# Patient Record
Sex: FEMALE | Race: WHITE | NOT HISPANIC OR LATINO | URBAN - METROPOLITAN AREA
[De-identification: names, ages, dates, MRNs, and addresses within clinical notes are randomized per-mention and may not be internally consistent; named-entity substitution may affect disease eponyms.]

---

## 2022-06-23 ENCOUNTER — EMERGENCY (EMERGENCY)
Facility: HOSPITAL | Age: 87
LOS: 1 days | Discharge: HOME | End: 2022-06-23
Attending: STUDENT IN AN ORGANIZED HEALTH CARE EDUCATION/TRAINING PROGRAM | Admitting: STUDENT IN AN ORGANIZED HEALTH CARE EDUCATION/TRAINING PROGRAM
Payer: MEDICARE

## 2022-06-23 VITALS
OXYGEN SATURATION: 100 % | TEMPERATURE: 98 F | SYSTOLIC BLOOD PRESSURE: 185 MMHG | DIASTOLIC BLOOD PRESSURE: 76 MMHG | HEART RATE: 71 BPM | RESPIRATION RATE: 16 BRPM

## 2022-06-23 VITALS
RESPIRATION RATE: 15 BRPM | OXYGEN SATURATION: 99 % | HEART RATE: 73 BPM | TEMPERATURE: 98 F | DIASTOLIC BLOOD PRESSURE: 65 MMHG | SYSTOLIC BLOOD PRESSURE: 137 MMHG

## 2022-06-23 LAB
ALBUMIN SERPL ELPH-MCNC: 4.2 G/DL — SIGNIFICANT CHANGE UP (ref 3.3–5)
ALP SERPL-CCNC: 96 U/L — SIGNIFICANT CHANGE UP (ref 40–120)
ALT FLD-CCNC: 10 U/L — SIGNIFICANT CHANGE UP (ref 4–33)
ANION GAP SERPL CALC-SCNC: 10 MMOL/L — SIGNIFICANT CHANGE UP (ref 7–14)
APPEARANCE UR: CLEAR — SIGNIFICANT CHANGE UP
AST SERPL-CCNC: 17 U/L — SIGNIFICANT CHANGE UP (ref 4–32)
BACTERIA # UR AUTO: ABNORMAL
BILIRUB SERPL-MCNC: 0.4 MG/DL — SIGNIFICANT CHANGE UP (ref 0.2–1.2)
BILIRUB UR-MCNC: NEGATIVE — SIGNIFICANT CHANGE UP
BUN SERPL-MCNC: 23 MG/DL — SIGNIFICANT CHANGE UP (ref 7–23)
CALCIUM SERPL-MCNC: 9.6 MG/DL — SIGNIFICANT CHANGE UP (ref 8.4–10.5)
CHLORIDE SERPL-SCNC: 104 MMOL/L — SIGNIFICANT CHANGE UP (ref 98–107)
CO2 SERPL-SCNC: 25 MMOL/L — SIGNIFICANT CHANGE UP (ref 22–31)
COLOR SPEC: SIGNIFICANT CHANGE UP
CREAT SERPL-MCNC: 0.96 MG/DL — SIGNIFICANT CHANGE UP (ref 0.5–1.3)
DIFF PNL FLD: ABNORMAL
EGFR: 53 ML/MIN/1.73M2 — LOW
EPI CELLS # UR: 10 /HPF — HIGH (ref 0–5)
GLUCOSE SERPL-MCNC: 84 MG/DL — SIGNIFICANT CHANGE UP (ref 70–99)
GLUCOSE UR QL: NEGATIVE — SIGNIFICANT CHANGE UP
HCT VFR BLD CALC: 40.1 % — SIGNIFICANT CHANGE UP (ref 34.5–45)
HGB BLD-MCNC: 12.6 G/DL — SIGNIFICANT CHANGE UP (ref 11.5–15.5)
HYALINE CASTS # UR AUTO: 0 /LPF — SIGNIFICANT CHANGE UP (ref 0–7)
KETONES UR-MCNC: ABNORMAL
LEUKOCYTE ESTERASE UR-ACNC: ABNORMAL
MCHC RBC-ENTMCNC: 26.1 PG — LOW (ref 27–34)
MCHC RBC-ENTMCNC: 31.4 GM/DL — LOW (ref 32–36)
MCV RBC AUTO: 83 FL — SIGNIFICANT CHANGE UP (ref 80–100)
NITRITE UR-MCNC: NEGATIVE — SIGNIFICANT CHANGE UP
NRBC # BLD: 0 /100 WBCS — SIGNIFICANT CHANGE UP
NRBC # FLD: 0 K/UL — SIGNIFICANT CHANGE UP
PH UR: 6 — SIGNIFICANT CHANGE UP (ref 5–8)
PLATELET # BLD AUTO: 261 K/UL — SIGNIFICANT CHANGE UP (ref 150–400)
POTASSIUM SERPL-MCNC: 4.2 MMOL/L — SIGNIFICANT CHANGE UP (ref 3.5–5.3)
POTASSIUM SERPL-SCNC: 4.2 MMOL/L — SIGNIFICANT CHANGE UP (ref 3.5–5.3)
PROT SERPL-MCNC: 6.5 G/DL — SIGNIFICANT CHANGE UP (ref 6–8.3)
PROT UR-MCNC: NEGATIVE — SIGNIFICANT CHANGE UP
RBC # BLD: 4.83 M/UL — SIGNIFICANT CHANGE UP (ref 3.8–5.2)
RBC # FLD: 14.1 % — SIGNIFICANT CHANGE UP (ref 10.3–14.5)
RBC CASTS # UR COMP ASSIST: 2 /HPF — SIGNIFICANT CHANGE UP (ref 0–4)
SODIUM SERPL-SCNC: 139 MMOL/L — SIGNIFICANT CHANGE UP (ref 135–145)
SP GR SPEC: 1.01 — SIGNIFICANT CHANGE UP (ref 1–1.05)
UROBILINOGEN FLD QL: SIGNIFICANT CHANGE UP
WBC # BLD: 5.6 K/UL — SIGNIFICANT CHANGE UP (ref 3.8–10.5)
WBC # FLD AUTO: 5.6 K/UL — SIGNIFICANT CHANGE UP (ref 3.8–10.5)
WBC UR QL: 9 /HPF — HIGH (ref 0–5)

## 2022-06-23 PROCEDURE — 99284 EMERGENCY DEPT VISIT MOD MDM: CPT | Mod: GC

## 2022-06-23 RX ORDER — CEPHALEXIN 500 MG
1 CAPSULE ORAL
Qty: 16 | Refills: 0
Start: 2022-06-23 | End: 2022-06-26

## 2022-06-23 RX ORDER — CEPHALEXIN 500 MG
500 CAPSULE ORAL ONCE
Refills: 0 | Status: COMPLETED | OUTPATIENT
Start: 2022-06-23 | End: 2022-06-23

## 2022-06-23 RX ADMIN — Medication 500 MILLIGRAM(S): at 18:13

## 2022-06-23 NOTE — ED PROVIDER NOTE - PHYSICAL EXAMINATION
Attending/MD Deena.   VITALS: reviewed  GEN: No apparent distress, A & O x 2  HEAD/EYES: NC/AT, PERRL, EOMI, anicteric sclerae, no conjunctival pallor  ENT: mucus membranes moist, trachea midline, no JVD, neck is supple  RESP: lungs CTA with equal breath sounds bilaterally, chest wall nontender and atraumatic  CV: heart with reg rhythm S1, S2, no murmur; distal pulses intact and symmetric bilaterally  ABDOMEN: normoactive bowel sounds, soft, nondistended, nontender  MSK: extremities atraumatic and nontender, no edema, no asymmetry.   SKIN: warm, dry, no rash, no bruising, no cyanosis.  NEURO: alert, mentating appropriately, no facial asymmetry.  PSYCH: Affect appropriate

## 2022-06-23 NOTE — ED PROVIDER NOTE - PATIENT PORTAL LINK FT
You can access the FollowMyHealth Patient Portal offered by [facility] by registering at the following website: [email or web address]. By joining [facility]’s FollowMyHealth portal, you will also be able to view your health information using other applications (apps) compatible with our system.

## 2022-06-23 NOTE — ED PROVIDER NOTE - ATTENDING CONTRIBUTION TO CARE
I have personally seen and examined this patient.  I have fully participated in the care of this patient. I performed a substantive portion of the visit including all aspects of the medical decision making. I have reviewed all pertinent clinical information, including history, physical exam, plan and the Resident’s note and agree except as noted. - MD Deena.    see my MDM

## 2022-06-23 NOTE — ED PROVIDER NOTE - PROGRESS NOTE DETAILS
Jesse: UA mildly +, will give 1 dose keflex here and send keflex to preferred pharmacy upon discharge. After keflex patient may leave. Discussed with patient and daughter at bedside.

## 2022-06-23 NOTE — ED PROVIDER NOTE - OBJECTIVE STATEMENT
89yo F with history of HTN presenting to hospital by EMS after bystander called EMS after patient was inquiring about returning about location of bus to return back to [place] from [place]. 89yo F with history of HTN presenting to hospital by EMS after bystander called EMS after patient was inquiring about returning about location of bus to return back to [place] from [place]. Per daughter at bedside patient is at baseline. Reportedly has history of UTIs and can become confused during those episodes.

## 2022-06-23 NOTE — ED PROVIDER NOTE - NSFOLLOWUPINSTRUCTIONS_ED_ALL_ED_FT
You were found to have a urinary tract infection. You were given 1 dose of antibiotics here in the hospital. Please  the rest of the antibiotics from your pharmacy to complete your course. If you develop worsening confusion or if you develop burning with urination please seek medical attention or return to the Emergency Department. It may be a good idea to discuss moving in with family to help surround yourself with familiar faces and help prevent getting lost in the future. Please take your medications as prescribed and follow-up with your PCP upon discharge.

## 2022-06-23 NOTE — ED PROVIDER NOTE - PRINCIPAL DIAGNOSIS
03/19/20                            Sania Acharya  [address] [facility] [address]    To Whom It May Concern:    This is to certify Sania Acharya was evaluated with C19 VIRTUAL HEALTH SCREENING on 03/19/20. Please excuse from work from 03/15/2020- 03/28/2020 for illness.        C19 VIRTUAL HEALTH SCREENING  [facility][note 3] Virtual Visits  [address] [facility] [address]  Phone: [phone]     Forgetfulness

## 2022-06-23 NOTE — ED PROVIDER NOTE - CARE PLAN
1 Principal Discharge DX:	Forgetfulness   Principal Discharge DX:	Forgetfulness  Assessment and plan of treatment:	?Forgetfulness, hx of frequent UTIs, r/u UTI.

## 2022-06-23 NOTE — ED ADULT TRIAGE NOTE - CHIEF COMPLAINT QUOTE
Pt arrives via EMS from street intersection trying to get on a bus. Bystander called 911 to report confusion. Pt states "I'm heading home. I live with my mom." Pt denies complaints. BGL 90. Denies PMH.

## 2022-06-23 NOTE — ED PROVIDER NOTE - CLINICAL SUMMARY MEDICAL DECISION MAKING FREE TEXT BOX
Attending/MD Deena. 89 yo F, with on HTN, recently started depression medication, that was 2mo ago, but daughter deneise any change in her behavior, pt appears comfortable, no significant trauma on my exam, will get ua, labs to check hyponatrremia or anemia, renal, but if all negative, the daughter reassured safety at home, and they will give the pt close observation. likely dc.

## 2022-06-23 NOTE — ED ADULT NURSE NOTE - OBJECTIVE STATEMENT
Received pt to Rm 28 with c/o altered mental status x several hours prior to arrival. Upon exam Pt is A&OX4, skin warm dry unremarkable, + strong regular radial pulses bi laterally. Pt denies abdominal pain, N/V/D, shortness of breath, difficulty breathing, fever, cough or chills. Pt was on the bus and had asked for directions to get back home, a bystander was concerned that she appeared confused and called 911. Pt's daughter at bedside reports patient is acting at baseline and denies complaints. Pt changed into gown and placed on cardiac monitor showing NSR. Pending MD mendiola.

## 2022-06-24 LAB
CULTURE RESULTS: SIGNIFICANT CHANGE UP
SPECIMEN SOURCE: SIGNIFICANT CHANGE UP

## 2023-01-16 ENCOUNTER — INPATIENT (INPATIENT)
Facility: HOSPITAL | Age: 88
LOS: 3 days | Discharge: HOME HEALTH | DRG: 884 | End: 2023-01-20
Attending: INTERNAL MEDICINE | Admitting: INTERNAL MEDICINE
Payer: MEDICARE

## 2023-01-16 VITALS
TEMPERATURE: 89 F | HEART RATE: 85 BPM | DIASTOLIC BLOOD PRESSURE: 78 MMHG | OXYGEN SATURATION: 98 % | SYSTOLIC BLOOD PRESSURE: 162 MMHG | RESPIRATION RATE: 20 BRPM

## 2023-01-16 DIAGNOSIS — R41.82 ALTERED MENTAL STATUS, UNSPECIFIED: ICD-10-CM

## 2023-01-16 PROBLEM — I10 ESSENTIAL (PRIMARY) HYPERTENSION: Chronic | Status: ACTIVE | Noted: 2022-06-23

## 2023-01-16 LAB
ALBUMIN SERPL ELPH-MCNC: 3.5 G/DL — SIGNIFICANT CHANGE UP (ref 3.3–5)
ALBUMIN SERPL ELPH-MCNC: 4.6 G/DL — SIGNIFICANT CHANGE UP (ref 3.3–5)
ALP SERPL-CCNC: 101 U/L — SIGNIFICANT CHANGE UP (ref 40–120)
ALP SERPL-CCNC: 73 U/L — SIGNIFICANT CHANGE UP (ref 40–120)
ALT FLD-CCNC: 20 U/L — SIGNIFICANT CHANGE UP (ref 10–45)
ALT FLD-CCNC: 26 U/L — SIGNIFICANT CHANGE UP (ref 10–45)
ANION GAP SERPL CALC-SCNC: 10 MMOL/L — SIGNIFICANT CHANGE UP (ref 5–17)
ANION GAP SERPL CALC-SCNC: 12 MMOL/L — SIGNIFICANT CHANGE UP (ref 5–17)
ANION GAP SERPL CALC-SCNC: 18 MMOL/L — HIGH (ref 5–17)
APPEARANCE UR: CLEAR — SIGNIFICANT CHANGE UP
APTT BLD: 28.6 SEC — SIGNIFICANT CHANGE UP (ref 27.5–35.5)
AST SERPL-CCNC: 36 U/L — SIGNIFICANT CHANGE UP (ref 10–40)
AST SERPL-CCNC: 37 U/L — SIGNIFICANT CHANGE UP (ref 10–40)
BACTERIA # UR AUTO: NEGATIVE — SIGNIFICANT CHANGE UP
BASE EXCESS BLDV CALC-SCNC: -0.8 MMOL/L — SIGNIFICANT CHANGE UP (ref -2–3)
BASE EXCESS BLDV CALC-SCNC: -5.2 MMOL/L — LOW (ref -2–3)
BASOPHILS # BLD AUTO: 0.1 K/UL — SIGNIFICANT CHANGE UP (ref 0–0.2)
BASOPHILS NFR BLD AUTO: 1.1 % — SIGNIFICANT CHANGE UP (ref 0–2)
BILIRUB SERPL-MCNC: 0.2 MG/DL — SIGNIFICANT CHANGE UP (ref 0.2–1.2)
BILIRUB SERPL-MCNC: 0.3 MG/DL — SIGNIFICANT CHANGE UP (ref 0.2–1.2)
BILIRUB UR-MCNC: NEGATIVE — SIGNIFICANT CHANGE UP
BLD GP AB SCN SERPL QL: NEGATIVE — SIGNIFICANT CHANGE UP
BUN SERPL-MCNC: 23 MG/DL — SIGNIFICANT CHANGE UP (ref 7–23)
BUN SERPL-MCNC: 25 MG/DL — HIGH (ref 7–23)
BUN SERPL-MCNC: 26 MG/DL — HIGH (ref 7–23)
CA-I SERPL-SCNC: 1.14 MMOL/L — LOW (ref 1.15–1.33)
CALCIUM SERPL-MCNC: 8.3 MG/DL — LOW (ref 8.4–10.5)
CALCIUM SERPL-MCNC: 8.5 MG/DL — SIGNIFICANT CHANGE UP (ref 8.4–10.5)
CALCIUM SERPL-MCNC: 9.5 MG/DL — SIGNIFICANT CHANGE UP (ref 8.4–10.5)
CHLORIDE BLDV-SCNC: 103 MMOL/L — SIGNIFICANT CHANGE UP (ref 96–108)
CHLORIDE SERPL-SCNC: 103 MMOL/L — SIGNIFICANT CHANGE UP (ref 96–108)
CHLORIDE SERPL-SCNC: 106 MMOL/L — SIGNIFICANT CHANGE UP (ref 96–108)
CHLORIDE SERPL-SCNC: 99 MMOL/L — SIGNIFICANT CHANGE UP (ref 96–108)
CK SERPL-CCNC: 139 U/L — SIGNIFICANT CHANGE UP (ref 25–170)
CO2 BLDV-SCNC: 24 MMOL/L — SIGNIFICANT CHANGE UP (ref 22–26)
CO2 BLDV-SCNC: 26 MMOL/L — SIGNIFICANT CHANGE UP (ref 22–26)
CO2 SERPL-SCNC: 16 MMOL/L — LOW (ref 22–31)
CO2 SERPL-SCNC: 20 MMOL/L — LOW (ref 22–31)
CO2 SERPL-SCNC: 21 MMOL/L — LOW (ref 22–31)
COLOR SPEC: COLORLESS — SIGNIFICANT CHANGE UP
CREAT SERPL-MCNC: 0.83 MG/DL — SIGNIFICANT CHANGE UP (ref 0.5–1.3)
CREAT SERPL-MCNC: 0.86 MG/DL — SIGNIFICANT CHANGE UP (ref 0.5–1.3)
CREAT SERPL-MCNC: 0.86 MG/DL — SIGNIFICANT CHANGE UP (ref 0.5–1.3)
DIFF PNL FLD: ABNORMAL
EGFR: 61 ML/MIN/1.73M2 — SIGNIFICANT CHANGE UP
EGFR: 61 ML/MIN/1.73M2 — SIGNIFICANT CHANGE UP
EGFR: 63 ML/MIN/1.73M2 — SIGNIFICANT CHANGE UP
EOSINOPHIL # BLD AUTO: 0.2 K/UL — SIGNIFICANT CHANGE UP (ref 0–0.5)
EOSINOPHIL NFR BLD AUTO: 2.3 % — SIGNIFICANT CHANGE UP (ref 0–6)
EPI CELLS # UR: 0 /HPF — SIGNIFICANT CHANGE UP
ETHANOL SERPL-MCNC: <10 MG/DL — SIGNIFICANT CHANGE UP (ref 0–10)
FLUAV AG NPH QL: SIGNIFICANT CHANGE UP
FLUBV AG NPH QL: SIGNIFICANT CHANGE UP
GAS PNL BLDV: 134 MMOL/L — LOW (ref 136–145)
GAS PNL BLDV: SIGNIFICANT CHANGE UP
GLUCOSE BLDV-MCNC: 93 MG/DL — SIGNIFICANT CHANGE UP (ref 70–99)
GLUCOSE SERPL-MCNC: 180 MG/DL — HIGH (ref 70–99)
GLUCOSE SERPL-MCNC: 77 MG/DL — SIGNIFICANT CHANGE UP (ref 70–99)
GLUCOSE SERPL-MCNC: 99 MG/DL — SIGNIFICANT CHANGE UP (ref 70–99)
GLUCOSE UR QL: NEGATIVE — SIGNIFICANT CHANGE UP
HCO3 BLDV-SCNC: 22 MMOL/L — SIGNIFICANT CHANGE UP (ref 22–29)
HCO3 BLDV-SCNC: 24 MMOL/L — SIGNIFICANT CHANGE UP (ref 22–29)
HCT VFR BLD CALC: 42.4 % — SIGNIFICANT CHANGE UP (ref 34.5–45)
HCT VFR BLDA CALC: 33 % — LOW (ref 34.5–46.5)
HGB BLD CALC-MCNC: 11.1 G/DL — LOW (ref 11.7–16.1)
HGB BLD-MCNC: 13.3 G/DL — SIGNIFICANT CHANGE UP (ref 11.5–15.5)
HYALINE CASTS # UR AUTO: 2 /LPF — SIGNIFICANT CHANGE UP (ref 0–2)
IMM GRANULOCYTES NFR BLD AUTO: 0.5 % — SIGNIFICANT CHANGE UP (ref 0–0.9)
INR BLD: 0.96 RATIO — SIGNIFICANT CHANGE UP (ref 0.88–1.16)
KETONES UR-MCNC: NEGATIVE — SIGNIFICANT CHANGE UP
LACTATE BLDV-MCNC: 1 MMOL/L — SIGNIFICANT CHANGE UP (ref 0.5–2)
LEUKOCYTE ESTERASE UR-ACNC: NEGATIVE — SIGNIFICANT CHANGE UP
LIDOCAIN IGE QN: 69 U/L — HIGH (ref 7–60)
LYMPHOCYTES # BLD AUTO: 3.43 K/UL — HIGH (ref 1–3.3)
LYMPHOCYTES # BLD AUTO: 38.6 % — SIGNIFICANT CHANGE UP (ref 13–44)
MCHC RBC-ENTMCNC: 26.2 PG — LOW (ref 27–34)
MCHC RBC-ENTMCNC: 31.4 GM/DL — LOW (ref 32–36)
MCV RBC AUTO: 83.6 FL — SIGNIFICANT CHANGE UP (ref 80–100)
MONOCYTES # BLD AUTO: 0.5 K/UL — SIGNIFICANT CHANGE UP (ref 0–0.9)
MONOCYTES NFR BLD AUTO: 5.6 % — SIGNIFICANT CHANGE UP (ref 2–14)
NEUTROPHILS # BLD AUTO: 4.61 K/UL — SIGNIFICANT CHANGE UP (ref 1.8–7.4)
NEUTROPHILS NFR BLD AUTO: 51.9 % — SIGNIFICANT CHANGE UP (ref 43–77)
NITRITE UR-MCNC: NEGATIVE — SIGNIFICANT CHANGE UP
NRBC # BLD: 0 /100 WBCS — SIGNIFICANT CHANGE UP (ref 0–0)
PCO2 BLDV: 41 MMHG — SIGNIFICANT CHANGE UP (ref 39–42)
PCO2 BLDV: 51 MMHG — HIGH (ref 39–42)
PH BLDV: 7.25 — LOW (ref 7.32–7.43)
PH BLDV: 7.38 — SIGNIFICANT CHANGE UP (ref 7.32–7.43)
PH UR: 6 — SIGNIFICANT CHANGE UP (ref 5–8)
PLATELET # BLD AUTO: 289 K/UL — SIGNIFICANT CHANGE UP (ref 150–400)
PO2 BLDV: 54 MMHG — HIGH (ref 25–45)
PO2 BLDV: 56 MMHG — HIGH (ref 25–45)
POTASSIUM BLDV-SCNC: 4.6 MMOL/L — SIGNIFICANT CHANGE UP (ref 3.5–5.1)
POTASSIUM SERPL-MCNC: 4.3 MMOL/L — SIGNIFICANT CHANGE UP (ref 3.5–5.3)
POTASSIUM SERPL-MCNC: 5.1 MMOL/L — SIGNIFICANT CHANGE UP (ref 3.5–5.3)
POTASSIUM SERPL-MCNC: 5.5 MMOL/L — HIGH (ref 3.5–5.3)
POTASSIUM SERPL-SCNC: 4.3 MMOL/L — SIGNIFICANT CHANGE UP (ref 3.5–5.3)
POTASSIUM SERPL-SCNC: 5.1 MMOL/L — SIGNIFICANT CHANGE UP (ref 3.5–5.3)
POTASSIUM SERPL-SCNC: 5.5 MMOL/L — HIGH (ref 3.5–5.3)
PROT SERPL-MCNC: 6.2 G/DL — SIGNIFICANT CHANGE UP (ref 6–8.3)
PROT SERPL-MCNC: 8 G/DL — SIGNIFICANT CHANGE UP (ref 6–8.3)
PROT UR-MCNC: ABNORMAL
PROTHROM AB SERPL-ACNC: 11 SEC — SIGNIFICANT CHANGE UP (ref 10.5–13.4)
RBC # BLD: 5.07 M/UL — SIGNIFICANT CHANGE UP (ref 3.8–5.2)
RBC # FLD: 13.5 % — SIGNIFICANT CHANGE UP (ref 10.3–14.5)
RBC CASTS # UR COMP ASSIST: 7 /HPF — HIGH (ref 0–4)
RH IG SCN BLD-IMP: POSITIVE — SIGNIFICANT CHANGE UP
RSV RNA NPH QL NAA+NON-PROBE: SIGNIFICANT CHANGE UP
SAO2 % BLDV: 84.4 % — SIGNIFICANT CHANGE UP (ref 67–88)
SAO2 % BLDV: 90.6 % — HIGH (ref 67–88)
SARS-COV-2 RNA SPEC QL NAA+PROBE: SIGNIFICANT CHANGE UP
SODIUM SERPL-SCNC: 133 MMOL/L — LOW (ref 135–145)
SODIUM SERPL-SCNC: 135 MMOL/L — SIGNIFICANT CHANGE UP (ref 135–145)
SODIUM SERPL-SCNC: 137 MMOL/L — SIGNIFICANT CHANGE UP (ref 135–145)
SP GR SPEC: 1.01 — SIGNIFICANT CHANGE UP (ref 1.01–1.02)
UROBILINOGEN FLD QL: NEGATIVE — SIGNIFICANT CHANGE UP
WBC # BLD: 8.88 K/UL — SIGNIFICANT CHANGE UP (ref 3.8–10.5)
WBC # FLD AUTO: 8.88 K/UL — SIGNIFICANT CHANGE UP (ref 3.8–10.5)
WBC UR QL: 1 /HPF — SIGNIFICANT CHANGE UP (ref 0–5)

## 2023-01-16 PROCEDURE — 72125 CT NECK SPINE W/O DYE: CPT | Mod: 26,MA

## 2023-01-16 PROCEDURE — 99291 CRITICAL CARE FIRST HOUR: CPT | Mod: GC

## 2023-01-16 PROCEDURE — 70450 CT HEAD/BRAIN W/O DYE: CPT | Mod: 26,MA

## 2023-01-16 PROCEDURE — 71260 CT THORAX DX C+: CPT | Mod: 26,MA

## 2023-01-16 PROCEDURE — 74177 CT ABD & PELVIS W/CONTRAST: CPT | Mod: 26,MA

## 2023-01-16 RX ORDER — SODIUM CHLORIDE 9 MG/ML
250 INJECTION INTRAMUSCULAR; INTRAVENOUS; SUBCUTANEOUS ONCE
Refills: 0 | Status: DISCONTINUED | OUTPATIENT
Start: 2023-01-16 | End: 2023-01-16

## 2023-01-16 RX ORDER — SODIUM CHLORIDE 9 MG/ML
1000 INJECTION INTRAMUSCULAR; INTRAVENOUS; SUBCUTANEOUS ONCE
Refills: 0 | Status: COMPLETED | OUTPATIENT
Start: 2023-01-16 | End: 2023-01-16

## 2023-01-16 RX ORDER — SERTRALINE 25 MG/1
1 TABLET, FILM COATED ORAL
Qty: 0 | Refills: 0 | DISCHARGE

## 2023-01-16 RX ORDER — TETANUS TOXOID, REDUCED DIPHTHERIA TOXOID AND ACELLULAR PERTUSSIS VACCINE, ADSORBED 5; 2.5; 8; 8; 2.5 [IU]/.5ML; [IU]/.5ML; UG/.5ML; UG/.5ML; UG/.5ML
0.5 SUSPENSION INTRAMUSCULAR ONCE
Refills: 0 | Status: COMPLETED | OUTPATIENT
Start: 2023-01-16 | End: 2023-01-16

## 2023-01-16 RX ORDER — HEPARIN SODIUM 5000 [USP'U]/ML
5000 INJECTION INTRAVENOUS; SUBCUTANEOUS EVERY 12 HOURS
Refills: 0 | Status: DISCONTINUED | OUTPATIENT
Start: 2023-01-16 | End: 2023-01-20

## 2023-01-16 RX ORDER — SODIUM CHLORIDE 9 MG/ML
1000 INJECTION INTRAMUSCULAR; INTRAVENOUS; SUBCUTANEOUS
Refills: 0 | Status: DISCONTINUED | OUTPATIENT
Start: 2023-01-16 | End: 2023-01-18

## 2023-01-16 RX ORDER — CEFTRIAXONE 500 MG/1
1000 INJECTION, POWDER, FOR SOLUTION INTRAMUSCULAR; INTRAVENOUS EVERY 24 HOURS
Refills: 0 | Status: COMPLETED | OUTPATIENT
Start: 2023-01-16 | End: 2023-01-20

## 2023-01-16 RX ADMIN — CEFTRIAXONE 100 MILLIGRAM(S): 500 INJECTION, POWDER, FOR SOLUTION INTRAMUSCULAR; INTRAVENOUS at 17:59

## 2023-01-16 RX ADMIN — SODIUM CHLORIDE 1000 MILLILITER(S): 9 INJECTION INTRAMUSCULAR; INTRAVENOUS; SUBCUTANEOUS at 07:05

## 2023-01-16 RX ADMIN — SODIUM CHLORIDE 1000 MILLILITER(S): 9 INJECTION INTRAMUSCULAR; INTRAVENOUS; SUBCUTANEOUS at 10:45

## 2023-01-16 RX ADMIN — HEPARIN SODIUM 5000 UNIT(S): 5000 INJECTION INTRAVENOUS; SUBCUTANEOUS at 21:58

## 2023-01-16 RX ADMIN — SODIUM CHLORIDE 80 MILLILITER(S): 9 INJECTION INTRAMUSCULAR; INTRAVENOUS; SUBCUTANEOUS at 21:55

## 2023-01-16 RX ADMIN — TETANUS TOXOID, REDUCED DIPHTHERIA TOXOID AND ACELLULAR PERTUSSIS VACCINE, ADSORBED 0.5 MILLILITER(S): 5; 2.5; 8; 8; 2.5 SUSPENSION INTRAMUSCULAR at 07:36

## 2023-01-16 NOTE — ED PROVIDER NOTE - CLINICAL SUMMARY MEDICAL DECISION MAKING FREE TEXT BOX
Dr. Houser's Note: Patient brought in after being found unresponsive on the ground with unclear history.  On arrival, patient initially had a decreased GCS that quickly perked up and is now around 10, still not following commands, but with eyes open withdrawing to pain groaning.  While there are signs of head trauma, clinically it seems to be secondary to one of her underlying medical condition may be going on.  No trauma activation at this time.  Patient will require full work-up to assess for any underlying metabolic or infectious etiology, treatment of hypothermia, imaging to rule out any underlying significant injury from her suspected fall and will likely require admission.

## 2023-01-16 NOTE — ED ADULT NURSE REASSESSMENT NOTE - NS ED NURSE REASSESS COMMENT FT1
Temperature sensing valles catheter placed, as per MD verbal orders, under sterile technique with 2 RNs at bedside. 200 ccs of clear, yellow urine drained. UA and UC collected and sent to lab.

## 2023-01-16 NOTE — H&P ADULT - NSHPPHYSICALEXAM_GEN_ALL_CORE
T(C): 36.7 (01-16-23 @ 10:30), Max: 36.7 (01-16-23 @ 10:30)  HR: 79 (01-16-23 @ 11:45) (71 - 88)  BP: 109/54 (01-16-23 @ 11:45) (92/53 - 162/78)  RR: 16 (01-16-23 @ 11:45) (14 - 20)  SpO2: 96% (01-16-23 @ 11:45) (95% - 98%)  GENERAL: NAD, lying in bed comfortably  HEAD:  Atraumatic, normocephalic  EYES: EOMI, PERRLA, conjunctiva and sclera clear  NECK: Supple, trachea midline, no JVD  HEART: Regular rate and rhythm, no murmurs, rubs, or gallops  LUNGS: Unlabored respirations.  Clear to auscultation bilaterally, no crackles, wheezing, or rhonchi  ABDOMEN: Soft, nontender, nondistended, +BS  EXTREMITIES: 2+ peripheral pulses bilaterally. No clubbing, cyanosis, or edema  NERVOUS SYSTEM:  A&Ox 1.  no focal deficits   SKIN: No rashes or lesions   hematoma  forhead/  ecchymoses. kness/

## 2023-01-16 NOTE — PATIENT PROFILE ADULT - FALL HARM RISK - HARM RISK INTERVENTIONS

## 2023-01-16 NOTE — ED PROVIDER NOTE - OBJECTIVE STATEMENT
89 yo F w/ unknown PMHx found outside on ground, presenting via EMS. Unknown how pt came to this location. VSS per EMS. Pt unable to provide sig PMHx due to ams. No hx known per EMS. Pt not in possession of significant information regarding I.D. or med hx, or contacts. Contusion/abrasion to forehead. Alert but not answering questions.

## 2023-01-16 NOTE — ED PROVIDER NOTE - PROGRESS NOTE DETAILS
Carlos Manuel Santos PGY3: Pt signed out to me pending labs and XR/CTs. Called daughter, Lidia Andersen ([phone]), for collateral. Pt has h/o mild dementia and leaky heart valve, and is usually A&O, verbal and able to perform ADLs on her own. Pt lives alone at a residence in [place] (although does intermittently stay with her daughter). Apparently pt gets very altered when she has UTIs, and has been having UTIs more frequently lately, last time being around [place]. Daughter will be coming to ED now. Attending (Efrain Clarke D.O.):  Patient was signed out to me, hypothermic otherwise hemodynamically stable, alert but not yet talking.  Possible suggestion of aspiration event given secretions in the mainstem bronchus.  Patient with repeat temperature of 94 degrees.  No leukocytosis, not anemic.  Anion gap slightly elevated at 18, bicarb low at 16.  Creatinine 0.86.  .  First pH on VBG 7.25.  Negative UA.  Will obtain repeat labs to see if bicarb improving as well as pH.  No clear source for hypothermia at this time but is likely environmental based on the reported history. Attending (Efrain Clarke D.O.):  Patient is now awake alert, AAO x2 back at baseline.  Patient's blood pressure 90s over 60s, component of rewarming basal plegia which is expected for this patient.  Discussed with family that given the lab work at this time suggest possible metabolic acidosis in the setting of rewarming basal plegia, patient would benefit from a trial of observation for hemodynamic stability.  Patient and family agree.  Discussed with Dr. Dean who states to admit to Dr. Dean states admit to Dr. Lewis patient admitted.

## 2023-01-16 NOTE — ED ADULT NURSE NOTE - OBJECTIVE STATEMENT
Pt is 89Y F, BIBEMS for unwitnessed fall, per EMS pt found down in front of firehouse, no pmhx available, EMS unable to get O2 sat, placed on 6LNC for comfort, pt brought in with c collar for fall, pt presents to ED with right forehead hematoma, pt nonverbal, responds to pain, temperature valles placed, pt temp 89.3F, MD Quinones made aware, warm NS bolus started, pt placed on bear hugger, and under warm blankets for comfort, pt responds to pain, spontaneous eye opening and responds to verbal stimuli when awake, pt nonverbal, A&Ox0, placed on cardiac monitor-NSR, pt O2 sat 96% and greater on RA, comfort and safety secured

## 2023-01-16 NOTE — PATIENT PROFILE ADULT - FUNCTIONAL ASSESSMENT - BASIC MOBILITY 6.
2-calculated by average/Not able to assess (calculate score using [facility] averaging method)

## 2023-01-16 NOTE — H&P ADULT - NSHPLABSRESULTS_GEN_ALL_CORE
LABS:                        13.3   8.88  )-----------( 289      ( 2023 07:36 )             42.4         135  |  103  |  25<H>  ----------------------------<  99  5.5<H>   |  20<L>  |  0.83    Ca    8.3<L>      2023 10:47    TPro  6.2  /  Alb  3.5  /  TBili  0.2  /  DBili  x   /  AST  36  /  ALT  20  /  AlkPhos  73      PT/INR - ( 2023 07:36 )   PT: 11.0 sec;   INR: 0.96 ratio         PTT - ( 2023 07:36 )  PTT:28.6 sec  CARDIAC MARKERS ( 2023 07:36 )  x     / x     / 139 U/L / x     / x          Urinalysis Basic - ( 2023 08:26 )    Color: Colorless / Appearance: Clear / S.010 / pH: x  Gluc: x / Ketone: Negative  / Bili: Negative / Urobili: Negative   Blood: x / Protein: 100 mg/dl / Nitrite: Negative   Leuk Esterase: Negative / RBC: 7 /hpf / WBC 1 /HPF   Sq Epi: x / Non Sq Epi: 0 /hpf / Bacteria: Negative           @ 10:36  4.6  56

## 2023-01-16 NOTE — H&P ADULT - ASSESSMENT
89 yo F     no PMHx , on no  meds . as  per  daughter   found outside on ground, presenting via EMS./ unknown how pt came to this location.   pt t unable to provide   any history    has contusion/abrasion to forehead.,  alert  now/ seen in er. unable to give  hx    daughter  at  bedside,  states, pt    has   been falling   lately    denies  cp/sob/abd  pain/ fevers   was hypothermic   on arrival. on  warming  blanket     *  admitted with  fall/?  syncope   ct   head,  no bleed   per daughter  pt  with recent  falls   was  hypothermic  on arrival /  sbo in  low 100/'s. ?  sepsis  on arrival    follow  bcx/ ucx/ on  empiric  iv rocephin/   for  probable aspiration  pna  ID eval   cxr.  with aspiration   card/ echo   rpt potassium  suspect  pt has  undiagnosed  underlying  dementia ,  now  with frequent  falls/  wandering    on dvt ppx          rad< from: CT Chest w/ IV Cont (01.16.23 @ 08:23) >  IMPRESSION:  No acute traumatic pathology in the chest abdomen or pelvis.  Secretions/debris in the trachea and left mainstem bronchus. Correlate   clinically for aspiration.  Chronic findings as above.  --- End of Report ---       89 yo F     no PMHx , on no  meds . as  per  daughter   found outside on ground, presenting via EMS./ unknown how pt came to this location.   pt t unable to provide   any history    has contusion/abrasion to forehead.,  alert  now/ seen in er. unable to give  hx    daughter  at  bedside,  states, pt    has   been falling   lately    denies  cp/sob/abd  pain/ fevers   was hypothermic   on arrival. on  warming  blanket     *  admitted with  fall/?  syncope   ct   head,  no bleed   per daughter  pt  with recent  falls   was  hypothermic  on arrival /  sbo in  low 100/'s. ?  sepsis  on arrival    follow  bcx/ ucx/ on  empiric  iv rocephin/   for  probable aspiration  pna  ID eval   cxr.  with aspiration   card/ echo   rpt potassium/  pelvic  xray pending  suspect  pt has  undiagnosed  underlying  dementia ,  now  with frequent  falls/  wandering    on dvt ppx          rad< from: CT Chest w/ IV Cont (01.16.23 @ 08:23) >  IMPRESSION:  No acute traumatic pathology in the chest abdomen or pelvis.  Secretions/debris in the trachea and left mainstem bronchus. Correlate   clinically for aspiration.  Chronic findings as above.  --- End of Report ---

## 2023-01-16 NOTE — H&P ADULT - HISTORY OF PRESENT ILLNESS
89 yo F     with no PMHx , on no  meds . as  per  daughter   found outside on ground, presenting via EMS.   unknown how pt came to this location./pt t unable to provide   any history    has contusion/abrasion to forehead.  alert  now/ seen in er. unable to give  hx    daughter  at  bedside,  states, pt    has   been falling   lately    denies  cp/sob/abd  pain/ fevers   was hypothermic   on arrival. on  warming  balnket

## 2023-01-16 NOTE — ED ADULT NURSE REASSESSMENT NOTE - NS ED NURSE REASSESS COMMENT FT1
Received report from Grant MOJICA. Patient resting comfortably in stretcher. Patient is alert to painful stimuli; not following commands currently. Patient in no acute distress. Patient pending x-rays and CT scan. Plan of care discussed. Safety and comfort measures maintained.

## 2023-01-16 NOTE — ED PROVIDER NOTE - PHYSICAL EXAMINATION
GEN: minimally responsive, disheveled, frail  HEENT: abrasion and contusion to R forehead and L cheek  Neck: supple,  in c-spine collar, no obvious deformities  CV: reg rate  RESP: normal WOB, clear bilaterally  ABD: non-distended, non-tender no massess  EXT/MSK:  spontaneously moves all extremities  SKIN: several superficial abrasions, very cold to touch  Neuro: GCS 10 E4, V2, M4

## 2023-01-16 NOTE — CONSULT NOTE ADULT - SUBJECTIVE AND OBJECTIVE BOX
CHIEF COMPLAINT:Patient is a 89y old  Female who presents with a chief complaint of fall/  sycnope (16 Jan 2023 12:00)      HPI:     89 yo F w/ unknown PMHx found outside on ground, presenting via EMS. Unknown how pt came to this location. VSS per EMS. Pt unable to provide sig PMHx due to ams. No hx known per EMS. Pt not in possession of significant information regarding I.D. or med hx, or contacts. Contusion/abrasion to forehead. Alert but not answering questions.    PAST MEDICAL & SURGICAL HISTORY:  HTN (hypertension)  Mitral regurgitation    MEDICATIONS  (STANDING):  cefTRIAXone   IVPB 1000 milliGRAM(s) IV Intermittent every 24 hours  heparin   Injectable 5000 Unit(s) SubCutaneous every 12 hours  sodium chloride 0.9%. 1000 milliLiter(s) (80 mL/Hr) IV Continuous <Continuous>    MEDICATIONS  (PRN):      FAMILY HISTORY:      SOCIAL HISTORY:    [x ] Non-smoker  [ ] Smoker  [ ] Alcohol    Allergies    lidocaine (Unknown)    Intolerances    	    REVIEW OF SYSTEMS:  CONSTITUTIONAL: No fever, weight loss, or fatigue  EYES: No eye pain, visual disturbances, or discharge  ENT:  No difficulty hearing, tinnitus, vertigo; No sinus or throat pain  NECK: No pain or stiffness  RESPIRATORY: No cough, wheezing, chills or hemoptysis; No Shortness of Breath  CARDIOVASCULAR: No chest pain, palpitations, passing out, dizziness, or leg swelling  GASTROINTESTINAL: No abdominal or epigastric pain. No nausea, vomiting, or hematemesis; No diarrhea or constipation. No melena or hematochezia.  GENITOURINARY: No dysuria, frequency, hematuria, or incontinence  NEUROLOGICAL: No headaches, memory loss, loss of strength, numbness, or tremors  SKIN: No itching, burning, rashes, or lesions   LYMPH Nodes: No enlarged glands  ENDOCRINE: No heat or cold intolerance; No hair loss  MUSCULOSKELETAL: No joint pain or swelling; No muscle, back, or extremity pain  PSYCHIATRIC: No depression, anxiety, mood swings, or difficulty sleeping  HEME/LYMPH: No easy bruising, or bleeding gums  ALLERGY AND IMMUNOLOGIC: No hives or eczema	    [ ] All others negative	  [ x] Unable to obtain    PHYSICAL EXAM:  T(C): 37.4 (01-16-23 @ 18:52), Max: 37.4 (01-16-23 @ 15:00)  HR: 76 (01-16-23 @ 18:52) (71 - 89)  BP: 107/62 (01-16-23 @ 18:52) (92/53 - 162/78)  RR: 18 (01-16-23 @ 18:52) (14 - 20)  SpO2: 95% (01-16-23 @ 18:52) (95% - 98%)  Wt(kg): --  I&O's Summary      Appearance: Normal	  HEENT:   Normal oral mucosa, PERRL, EOMI, echymosis and fore bleeding	  Lymphatic: No lymphadenopathy  Cardiovascular: Normal S1 S2, No JVD, + murmurs, No edema  Respiratory: Lungs clear to auscultation	  Psychiatry:dementia  Gastrointestinal:  Soft, Non-tender, + BS	  Skin: No rashes, No ecchymoses, No cyanosis	  Neurologic: Non-focal  Extremities: Normal range of motion, No clubbing, cyanosis or edema  Vascular: Peripheral pulses palpable 2+ bilaterally    TELEMETRY: 	    ECG:  	  RADIOLOGY:  OTHER: 	  	  LABS:	 	    CARDIAC MARKERS:  CARDIAC MARKERS ( 16 Jan 2023 07:36 )  x     / x     / 139 U/L / x     / x                                  13.3   8.88  )-----------( 289      ( 16 Jan 2023 07:36 )             42.4     01-16    137  |  106  |  23  ----------------------------<  77  4.3   |  21<L>  |  0.86    Ca    8.5      16 Jan 2023 15:13    TPro  6.2  /  Alb  3.5  /  TBili  0.2  /  DBili  x   /  AST  36  /  ALT  20  /  AlkPhos  73  01-16    proBNP:   Lipid Profile:   HgA1c:   TSH:   PT/INR - ( 16 Jan 2023 07:36 )   PT: 11.0 sec;   INR: 0.96 ratio         PTT - ( 16 Jan 2023 07:36 )  PTT:28.6 sec    PREVIOUS DIAGNOSTIC TESTING:      < from: 12 Lead ECG (01.16.23 @ 07:12) >  Diagnosis Line SINUS RHYTHM WITH PREMATURE SUPRAVENTRICULAR COMPLEXES  NONSPECIFIC ST ABNORMALITY  ABNORMAL ECG  WHEN COMPARED WITH ECG OF 15-DEC-2007 19:48,  no  SIGNIFICANT CHANGES HAVE OCCURRED    < from: CT Chest w/ IV Cont (01.16.23 @ 08:23) >  No acute traumatic pathology in the chest abdomen or pelvis.  Secretions/debris in the trachea and left mainstem bronchus. Correlate   clinically for aspiration.  Chronic findings as above.  < from: CT Chest w/ IV Cont (01.16.23 @ 08:23) >  LUNGS AND LARGE AIRWAYS: Tracheal and left main stem bronchial   secretions/debris. There is mild diffuse bronchial thickening. No   airspace consolidation. Bibasilar subsegmental atelectasis.  PLEURA: No pleural effusion.  VESSELS: No thoracic aortic dissection. Bovine aortic arch. Calcified and   noncalcified atheromatous plaque.  HEART: Cardiomegaly. No pericardial effusion. Coronary artery   calcifications. Aortic valve and mitral annular calcifications.  MEDIASTINUM AND SYED: No lymphadenopathy.  CHEST WALL AND LOWER NECK: 1.1 cm left breast nodule with associated   coarse calcifications.

## 2023-01-16 NOTE — ED ADULT NURSE REASSESSMENT NOTE - NS ED NURSE REASSESS COMMENT FT1
Patient's temperature now 98 degrees Fahrenheit; hypothermia blanket removed. Patient now A&Ox4; speaking coherently in full sentences.

## 2023-01-16 NOTE — H&P ADULT - NSHPREVIEWOFSYSTEMS_GEN_ALL_CORE
REVIEW OF SYSTEMS:  CONSTITUTIONAL: No fever,  no  weight loss  ENT:  No  tinnitus,   no   vertigo  NECK: No pain or stiffness  RESPIRATORY: No cough, wheezing, chills or hemoptysis;    No Shortness of Breath  CARDIOVASCULAR: No chest pain, palpitations, dizziness  GASTROINTESTINAL: No abdominal or epigastric pain. No nausea, vomiting, or hematemesis; No diarrhea  No melena or hematochezia.  GENITOURINARY: No dysuria, frequency, hematuria, or incontinence  NEUROLOGICAL: No headaches  SKIN: No itching,  no   rash  LYMPH Nodes: No enlarged glands  ENDOCRINE: No heat or cold intolerance  MUSCULOSKELETAL: No joint pain or swelling  PSYCHIATRIC: No depression, anxiety  HEME/LYMPH: No easy bruising, or bleeding gums  ALLERGY AND IMMUNOLOGIC: No hives or eczema

## 2023-01-17 LAB
ANION GAP SERPL CALC-SCNC: 9 MMOL/L — SIGNIFICANT CHANGE UP (ref 5–17)
BUN SERPL-MCNC: 20 MG/DL — SIGNIFICANT CHANGE UP (ref 7–23)
CALCIUM SERPL-MCNC: 9 MG/DL — SIGNIFICANT CHANGE UP (ref 8.4–10.5)
CHLORIDE SERPL-SCNC: 106 MMOL/L — SIGNIFICANT CHANGE UP (ref 96–108)
CO2 SERPL-SCNC: 22 MMOL/L — SIGNIFICANT CHANGE UP (ref 22–31)
CREAT SERPL-MCNC: 0.96 MG/DL — SIGNIFICANT CHANGE UP (ref 0.5–1.3)
EGFR: 53 ML/MIN/1.73M2 — LOW
GLUCOSE SERPL-MCNC: 86 MG/DL — SIGNIFICANT CHANGE UP (ref 70–99)
HCT VFR BLD CALC: 31.5 % — LOW (ref 34.5–45)
HGB BLD-MCNC: 9.8 G/DL — LOW (ref 11.5–15.5)
MCHC RBC-ENTMCNC: 26.2 PG — LOW (ref 27–34)
MCHC RBC-ENTMCNC: 31.1 GM/DL — LOW (ref 32–36)
MCV RBC AUTO: 84.2 FL — SIGNIFICANT CHANGE UP (ref 80–100)
NRBC # BLD: 0 /100 WBCS — SIGNIFICANT CHANGE UP (ref 0–0)
PLATELET # BLD AUTO: 215 K/UL — SIGNIFICANT CHANGE UP (ref 150–400)
POTASSIUM SERPL-MCNC: 4.2 MMOL/L — SIGNIFICANT CHANGE UP (ref 3.5–5.3)
POTASSIUM SERPL-SCNC: 4.2 MMOL/L — SIGNIFICANT CHANGE UP (ref 3.5–5.3)
RBC # BLD: 3.74 M/UL — LOW (ref 3.8–5.2)
RBC # FLD: 13.6 % — SIGNIFICANT CHANGE UP (ref 10.3–14.5)
SODIUM SERPL-SCNC: 137 MMOL/L — SIGNIFICANT CHANGE UP (ref 135–145)
TSH SERPL-MCNC: 2.42 UIU/ML — SIGNIFICANT CHANGE UP (ref 0.27–4.2)
VIT B12 SERPL-MCNC: 664 PG/ML — SIGNIFICANT CHANGE UP (ref 232–1245)
WBC # BLD: 6.15 K/UL — SIGNIFICANT CHANGE UP (ref 3.8–10.5)
WBC # FLD AUTO: 6.15 K/UL — SIGNIFICANT CHANGE UP (ref 3.8–10.5)

## 2023-01-17 PROCEDURE — 99222 1ST HOSP IP/OBS MODERATE 55: CPT

## 2023-01-17 RX ADMIN — HEPARIN SODIUM 5000 UNIT(S): 5000 INJECTION INTRAVENOUS; SUBCUTANEOUS at 22:08

## 2023-01-17 RX ADMIN — CEFTRIAXONE 100 MILLIGRAM(S): 500 INJECTION, POWDER, FOR SOLUTION INTRAMUSCULAR; INTRAVENOUS at 17:52

## 2023-01-17 RX ADMIN — HEPARIN SODIUM 5000 UNIT(S): 5000 INJECTION INTRAVENOUS; SUBCUTANEOUS at 13:17

## 2023-01-17 NOTE — PHYSICAL THERAPY INITIAL EVALUATION ADULT - GAIT DEVIATIONS NOTED, PT EVAL
pushing walker too far ahead, difficulty advancing walker at times especially during turns/decreased puneet

## 2023-01-17 NOTE — SWALLOW BEDSIDE ASSESSMENT ADULT - SLP GENERAL OBSERVATIONS
Pt encountered OOB to chair, awake/alert, on room air. A&Ox1. Able to follow simple directives. Vocal quality WFL.

## 2023-01-17 NOTE — SWALLOW BEDSIDE ASSESSMENT ADULT - SWALLOW EVAL: DIAGNOSIS
89 y/o admitted with fall, CT Chest with findings of secretions/ debris in the trachea and left mainstem bronchus. Pt presents with mildly prolonged mastication of regular solid textures 2/2 incomplete dentition. Swallow profile otherwise WFL/ unremarkable, with swallow trigger judged to be timely, and no overt signs of laryngeal penetration/aspiration observed.

## 2023-01-17 NOTE — BH CONSULTATION LIAISON ASSESSMENT NOTE - NSBHCHARTREVIEWLAB_PSY_A_CORE FT
9.8    6.15  )-----------( 215      ( 2023 06:34 )             31.5     01-    137  |  106  |  20  ----------------------------<  86  4.2   |  22  |  0.96    Ca    9.0      2023 06:35    TPro  6.2  /  Alb  3.5  /  TBili  0.2  /  DBili  x   /  AST  36  /  ALT  20  /  AlkPhos  73  01-16    Urinalysis Basic - ( 2023 08:26 )    Color: Colorless / Appearance: Clear / S.010 / pH: x  Gluc: x / Ketone: Negative  / Bili: Negative / Urobili: Negative   Blood: x / Protein: 100 mg/dl / Nitrite: Negative   Leuk Esterase: Negative / RBC: 7 /hpf / WBC 1 /HPF   Sq Epi: x / Non Sq Epi: 0 /hpf / Bacteria: Negative

## 2023-01-17 NOTE — BH CONSULTATION LIAISON ASSESSMENT NOTE - NSBHCONSULTRECOMMENDOTHER_PSY_A_CORE FT
1. If family amenable, may utilize PRN: Seroquel 12.5mg PO q6h PRN agitation; Haldol 0.25mg IV q6h PRN severe agitation.  Monitor for QTc<500.  Melatonin 3mg PO qHS PRN insomnia.    3. Check: TSH, B12, folate     4. Minimize use of benzos, opioids, anticholinergics, or other deliriogenic agents when possible.  Maintain sleep wake cycle.  Provide frequent reorientation and redirection.  Family member at bedside if possible. Assess for need for glasses and hearing aid (if applicable).    5. Pt does not meet criteria for psychiatric hospitalization.  Recommend outpt [facility] f/[facility] at [facility] after d/c- [phone].   [facility] will follow.

## 2023-01-17 NOTE — PROGRESS NOTE ADULT - ASSESSMENT
89 yo F     no PMHx , on no  meds . as  per  daughter   found outside on ground, presenting via EMS./ unknown how pt came to this location.   pt t unable to provide   any history    has contusion/abrasion to forehead.,  alert  now/ seen in er. unable to give  hx    daughter  at  bedside,  states, pt    has   been falling   lately    denies  cp/sob/abd  pain/ fevers   was hypothermic   on arrival. on  warming  blanket     *  admitted with  fall/?  syncope   ct   head,  no bleed   per daughter  pt  with recent  falls   was  hypothermic  on arrival /  sbo in  low 100/'s. ?  sepsis  on arrival    follow  bcx/ ucx/ on  empiric  iv rocephin/   for  probable aspiration  pna  ID eval   cxr.  with aspiration   card/ echo   rpt potassium/  pelvic  xray pending  suspect  pt has  undiagnosed  underlying  dementia ,  now  with frequent  falls/  wandering    on dvt ppx   on iv rocephin, folwo cx         rad< from: CT Chest w/ IV Cont (01.16.23 @ 08:23) >  IMPRESSION:  No acute traumatic pathology in the chest abdomen or pelvis.  Secretions/debris in the trachea and left mainstem bronchus. Correlate   clinically for aspiration.  Chronic findings as above.  --- End of Report ---

## 2023-01-17 NOTE — BH CONSULTATION LIAISON ASSESSMENT NOTE - NSBHCONSULTFOLLOWAFTERCARE_PSY_A_CORE FT
OP psych f/u at [facility]- [phone]  [facility] clinic- [phone]

## 2023-01-17 NOTE — BH CONSULTATION LIAISON ASSESSMENT NOTE - NSBHCHARTREVIEWINVESTIGATE_PSY_A_CORE FT
< from: 12 Lead ECG (01.16.23 @ 07:12) >      Ventricular Rate 74 BPM    Atrial Rate 74 BPM    P-R Interval 134 ms    QRS Duration 82 ms    Q-T Interval 428 ms    QTC Calculation(Bazett) 475 ms    P Axis 10 degrees    R Axis 32 degrees    T Axis 74 degrees    Diagnosis Line SINUS RHYTHM WITH PREMATURE SUPRAVENTRICULAR COMPLEXES  NONSPECIFIC ST ABNORMALITY  ABNORMAL ECG  WHEN COMPARED WITH ECG OF 15-DEC-2007 19:48,  no  SIGNIFICANT CHANGES HAVE OCCURRED  Confirmed by KARLENE SINGH MD ([id number]) on 1/16/2023 10:03:27 AM    < end of copied text >

## 2023-01-17 NOTE — SWALLOW BEDSIDE ASSESSMENT ADULT - COMMENTS
*  admitted with  fall/?  syncope   ct   head,  no bleed   per daughter  pt  with recent  falls   was  hypothermic  on arrival /  sbo in  low 100/'s. ?  sepsis  on arrival    follow  bcx/ ucx/ on  empiric  iv rocephin/   for  probable aspiration  pna  ID eval   cxr.  with aspiration   card/ echo   rpt potassium/  pelvic  xray pending  suspect  pt has  undiagnosed  underlying  dementia ,  now  with frequent  falls/  wandering    on dvt ppx   on iv rocephin, folwo cx    rad< from: CT Chest w/ IV Cont (01.16.23 @ 08:23) >  IMPRESSION:  No acute traumatic pathology in the chest abdomen or pelvis.  Secretions/debris in the trachea and left mainstem bronchus. Correlate   clinically for aspiration.  Chronic findings as above

## 2023-01-17 NOTE — PHYSICAL THERAPY INITIAL EVALUATION ADULT - PERTINENT HX OF CURRENT PROBLEM, REHAB EVAL
89 y/oF admitted 1/16, found outside on ground, presenting via EMS. Pt unable to provide history, unknown how she got to that location. Contusion/abrasion to forehead. As per H&P, daughter at bedside, stating pt has been falling lately. Hypothermic on arrival. CT head neg for bleed. As per cardiology consult, pt with hx of dementia with multiple falls >syncope recently while standing. ecg noted narrow qrs no sign of conduction diseases check orthostatics. Pt with hx of Mitral Regurgitation as per daughter, not a candidate for any aggressive measures. Test results- CT chest/abdomen/pelvis No acute traumatic pathology in the chest abdomen or pelvis. Secretions/debris in the trachea and left mainstem bronchus. Correlate clinically for aspiration. Neck CT with no evidence of fx or traumatic malalignment.

## 2023-01-17 NOTE — PHYSICAL THERAPY INITIAL EVALUATION ADULT - GENERAL OBSERVATIONS, REHAB EVAL
Received pt bedside on unit. BP in supine 133/65, sitting 121/57, and standing 118/68. Pt with no c/o dizziness. R forehead and R knee bruising noted. +reena.

## 2023-01-17 NOTE — PHYSICAL THERAPY INITIAL EVALUATION ADULT - PLANNED THERAPY INTERVENTIONS, PT EVAL
stair training- GOAL: 12 stairs with rail and supervision, 8 wks/balance training/bed mobility training/gait training/transfer training

## 2023-01-17 NOTE — BH CONSULTATION LIAISON ASSESSMENT NOTE - NSBHCHARTREVIEWVS_PSY_A_CORE FT
Vital Signs Last 24 Hrs  T(C): 36.8 (17 Jan 2023 05:23), Max: 37.4 (16 Jan 2023 15:00)  T(F): 98.3 (17 Jan 2023 05:23), Max: 99.4 (16 Jan 2023 15:00)  HR: 79 (17 Jan 2023 05:23) (74 - 89)  BP: 127/65 (17 Jan 2023 05:23) (107/62 - 127/65)  BP(mean): 86 (16 Jan 2023 18:00) (76 - 86)  RR: 18 (17 Jan 2023 05:23) (18 - 20)  SpO2: 100% (17 Jan 2023 10:18) (95% - 100%)    Parameters below as of 17 Jan 2023 10:18  Patient On (Oxygen Delivery Method): room air

## 2023-01-17 NOTE — BH CONSULTATION LIAISON ASSESSMENT NOTE - HPI (INCLUDE ILLNESS QUALITY, SEVERITY, DURATION, TIMING, CONTEXT, MODIFYING FACTORS, ASSOCIATED SIGNS AND SYMPTOMS)
89F domiciled alone, has an adult daughter, with no formal PPHx, no active substance abuse, with no significant PMH admitted s/p fall, psychiatry consulted for management of agitation.      Pt seen sitting in gerichair in the hallway, calm and pleasant on interview, chatting personably.  Pt oriented to self-only.  Patient states she is feeling "wonderful," denies any symptoms of depression, anxiety, richy, substance or alcohol abuse, or SIIP/HIIP.  Denies past suicide attempts, psychiatric history or use of psychiatric medications. Pt reports she is here with a fall, showes bruises on he rlegs, but states she is in "[facility]" and that she lives with her  (her  is ).      Collateral obtained from pt's daughter: states this is not pt's baseline, as she is usually alert and oriented, keeping up with the news and current events, living alone independently.  States she may be acting like this because she is "stuck in a hospital room with unfamiliar faces."  States pt is typically very social and loves to talk with others and has never displayed any depressive symptoms or had prior psychiatric history.  Daughter states pt has never received any formal evaluation for dementia, but has noted pt has declined cognitively in s/o isolation during the COVID pandemic.

## 2023-01-17 NOTE — BH CONSULTATION LIAISON ASSESSMENT NOTE - SUMMARY
89F domiciled alone, has an adult daughter, with no formal PPHx, no active substance abuse, with no significant PMH admitted s/p fall, psychiatry consulted for management of agitation.  Pt AOx2 on interview, calm and cooperative, denies SI/HI, pleasant but confused. Daughter notes departure from baseline MS c/w delirium 2/2 [facility], also expresses c/f cognitive decline during COVID19 pandemic.

## 2023-01-17 NOTE — SWALLOW BEDSIDE ASSESSMENT ADULT - ASPIRATION PRECAUTIONS
Monitor for s/s aspiration/laryngeal penetration. If noted:  D/C p.o. intake, provide non-oral nutrition/hydration/meds, and contact this service @ [phone]

## 2023-01-17 NOTE — BH CONSULTATION LIAISON ASSESSMENT NOTE - CURRENT ACTIVE IDEATION:
"Earlier this AM, while \"on a walk\"  pt lost her footing and fell to the ground hitting a concrete sidewalk.  C/O left eye laceration, and bilateral knee pain. Pt denies any domestic high risk areas, or international travel within the past 14 days.   Chief Complaint   Patient presents with   • T-5000 FALL   • Facial Laceration   • Knee Pain     BP (!) 173/98   Pulse 87   Temp 36.7 °C (98 °F) (Temporal)   Resp 16   Ht 1.727 m (5' 8\")   Wt 84.5 kg (186 lb 4.6 oz)   SpO2 97%   BMI 28.33 kg/m²     "
None known

## 2023-01-17 NOTE — BH CONSULTATION LIAISON ASSESSMENT NOTE - SELF INJURIOUS BEHAVIOR WITHOUT SUICIDAL INTENT:
You can access the FollowMyHealth Patient Portal offered by [facility] by registering at the following website: [email or web address]. By joining [facility]’s FollowMyHealth portal, you will also be able to view your health information using other applications (apps) compatible with our system.
None known

## 2023-01-17 NOTE — PHYSICAL THERAPY INITIAL EVALUATION ADULT - ADDITIONAL COMMENTS
Pt states lives in 2nd floor condo, 2 flights to enter, with  (?). States is independent. Ambulates with hurry cane, is able to negotiate stairs. States has rolling walker, does not use it. Showers in standing (+)grab bars. Pt states lives in 2nd floor condo, 2 flights to enter, with  (?). States is independent. Ambulates with hurry cane, is able to negotiate stairs. States has rolling walker, does not use it. Showers in standing (+)grab bars. As per conversation with daughter at bedside, after d/c pt will be staying with her and her  [place].  works from home most days a week, daughter plans to hire assistance for other times. Pt will sleep on lower level, bathroom on both levels.

## 2023-01-17 NOTE — PHYSICAL THERAPY INITIAL EVALUATION ADULT - AMBULATION SKILLS, REHAB EVAL
Routing refill request to provider for review/approval because:  Mariama given x1 and patient did not follow up, please advise    No appointment pending at this time.  Routing to provider to advise.    Tabitha Srivastava, CINDYN RN             needs device

## 2023-01-17 NOTE — SWALLOW BEDSIDE ASSESSMENT ADULT - SLP PERTINENT HISTORY OF CURRENT PROBLEM
89 yo F no PMHx , on no  meds . as  per  daughter found outside on ground, presenting via EMS./ unknown how pt came to this location. pt t unable to provide   any history. has contusion/abrasion to forehead.,  alert  now/ seen in er. unable to give  hx. daughter  at  bedside,  states, pt    has   been falling   lately. denies  cp/sob/abd  pain/ fevers. was hypothermic   on arrival. on  warming  blanket

## 2023-01-17 NOTE — BH CONSULTATION LIAISON ASSESSMENT NOTE - CURRENT MEDICATION
MEDICATIONS  (STANDING):  cefTRIAXone   IVPB 1000 milliGRAM(s) IV Intermittent every 24 hours  heparin   Injectable 5000 Unit(s) SubCutaneous every 12 hours  sodium chloride 0.9%. 1000 milliLiter(s) (80 mL/Hr) IV Continuous <Continuous>    MEDICATIONS  (PRN):

## 2023-01-18 ENCOUNTER — TRANSCRIPTION ENCOUNTER (OUTPATIENT)
Age: 88
End: 2023-01-18

## 2023-01-18 DIAGNOSIS — R68.0 HYPOTHERMIA, NOT ASSOCIATED WITH LOW ENVIRONMENTAL TEMPERATURE: ICD-10-CM

## 2023-01-18 LAB
FOLATE SERPL-MCNC: 6 NG/ML — SIGNIFICANT CHANGE UP
HCT VFR BLD CALC: 34.2 % — LOW (ref 34.5–45)
HGB BLD-MCNC: 10.7 G/DL — LOW (ref 11.5–15.5)
MCHC RBC-ENTMCNC: 26 PG — LOW (ref 27–34)
MCHC RBC-ENTMCNC: 31.3 GM/DL — LOW (ref 32–36)
MCV RBC AUTO: 83.2 FL — SIGNIFICANT CHANGE UP (ref 80–100)
NRBC # BLD: 0 /100 WBCS — SIGNIFICANT CHANGE UP (ref 0–0)
PLATELET # BLD AUTO: 240 K/UL — SIGNIFICANT CHANGE UP (ref 150–400)
RBC # BLD: 4.11 M/UL — SIGNIFICANT CHANGE UP (ref 3.8–5.2)
RBC # FLD: 13.4 % — SIGNIFICANT CHANGE UP (ref 10.3–14.5)
TSH SERPL-MCNC: 2.42 UIU/ML — SIGNIFICANT CHANGE UP (ref 0.27–4.2)
VIT B12 SERPL-MCNC: 641 PG/ML — SIGNIFICANT CHANGE UP (ref 232–1245)
WBC # BLD: 4.93 K/UL — SIGNIFICANT CHANGE UP (ref 3.8–10.5)
WBC # FLD AUTO: 4.93 K/UL — SIGNIFICANT CHANGE UP (ref 3.8–10.5)

## 2023-01-18 PROCEDURE — 99222 1ST HOSP IP/OBS MODERATE 55: CPT

## 2023-01-18 PROCEDURE — 93306 TTE W/DOPPLER COMPLETE: CPT | Mod: 26

## 2023-01-18 RX ADMIN — HEPARIN SODIUM 5000 UNIT(S): 5000 INJECTION INTRAVENOUS; SUBCUTANEOUS at 21:43

## 2023-01-18 RX ADMIN — HEPARIN SODIUM 5000 UNIT(S): 5000 INJECTION INTRAVENOUS; SUBCUTANEOUS at 10:01

## 2023-01-18 RX ADMIN — CEFTRIAXONE 100 MILLIGRAM(S): 500 INJECTION, POWDER, FOR SOLUTION INTRAMUSCULAR; INTRAVENOUS at 17:24

## 2023-01-18 RX ADMIN — SODIUM CHLORIDE 80 MILLILITER(S): 9 INJECTION INTRAMUSCULAR; INTRAVENOUS; SUBCUTANEOUS at 00:58

## 2023-01-18 NOTE — DISCHARGE NOTE PROVIDER - NSDCFUADDAPPT_GEN_ALL_CORE_FT
APPTS ARE READY TO BE MADE: [X] YES    Best Family or Patient Contact (if needed):    Additional Information about above appointments (if needed):    1:   2:   3:     Other comments or requests:    APPTS ARE READY TO BE MADE: [X] YES    Best Family or Patient Contact (if needed):    Additional Information about above appointments (if needed):    1:   2:   3:     Other comments or requests:   3 attempts were made to reach patient, which have been unsuccessful. Unable to leave voicemail on 01/21 and 01/22 and left voicemail for daughter on 01/23. Will await a call back from patient to coordinate follow up  care with Dr. Dean.

## 2023-01-18 NOTE — CONSULT NOTE ADULT - SUBJECTIVE AND OBJECTIVE BOX
JETHRO BARDALES 89y Female  MRN-[id number]    Patient is a 89y old  Female who presents with a chief complaint of fall/  sycnope (18 Jan 2023 09:25)      HPI:  89 yo F with no PMHx , on no  meds . as  per  daughter found outside on ground, presenting via EMS.   unknown how pt came to this location./pt t unable to provide   any history    has contusion/abrasion to forehead.  alert  now/ seen in er. unable to give  hx    daughter  at  bedside,  states, pt    has   been falling   lately    denies  cp/sob/abd  pain/ fevers   was hypothermic   on arrival. on  warming  balnket  (16 Jan 2023 12:00)      PAST MEDICAL & SURGICAL HISTORY:  HTN (hypertension)          Allergies    lidocaine (Unknown)    Intolerances        ANTIMICROBIALS:  cefTRIAXone   IVPB 1000 every 24 hours      MEDICATIONS  (STANDING):  cefTRIAXone   IVPB 1000 milliGRAM(s) IV Intermittent every 24 hours  heparin   Injectable 5000 Unit(s) SubCutaneous every 12 hours      Social History  Smoking: no  Etoh: no  Drug use: no      FAMILY HISTORY: No h/o UTI      Vital Signs Last 24 Hrs  T(C): 37.1 (18 Jan 2023 05:01), Max: 37.1 (17 Jan 2023 21:03)  T(F): 98.8 (18 Jan 2023 05:01), Max: 98.8 (18 Jan 2023 05:01)  HR: 75 (18 Jan 2023 05:01) (75 - 85)  BP: 155/71 (18 Jan 2023 05:01) (126/66 - 155/71)  BP(mean): --  RR: 17 (18 Jan 2023 05:01) (17 - 18)  SpO2: 97% (18 Jan 2023 05:01) (93% - 97%)    Parameters below as of 18 Jan 2023 05:01  Patient On (Oxygen Delivery Method): room air        CBC Full  -  ( 18 Jan 2023 10:27 )  WBC Count : 4.93 K/uL  RBC Count : 4.11 M/uL  Hemoglobin : 10.7 g/dL  Hematocrit : 34.2 %  Platelet Count - Automated : 240 K/uL  Mean Cell Volume : 83.2 fl  Mean Cell Hemoglobin : 26.0 pg  Mean Cell Hemoglobin Concentration : 31.3 gm/dL  Auto Neutrophil # : x  Auto Lymphocyte # : x  Auto Monocyte # : x  Auto Eosinophil # : x  Auto Basophil # : x  Auto Neutrophil % : x  Auto Lymphocyte % : x  Auto Monocyte % : x  Auto Eosinophil % : x  Auto Basophil % : x    01-17    137  |  106  |  20  ----------------------------<  86  4.2   |  22  |  0.96    Ca    9.0      17 Jan 2023 06:35            MICROBIOLOGY:        RADIOLOGY  < from: CT chest/Abdomen and Pelvis w/ IV Cont (01.16.23 @ 08:23) >  No acute traumatic pathology in the chest abdomen or pelvis.  Secretions/debris in the trachea and left mainstem bronchus. Correlate   clinically for aspiration.  Chronic findings as above.      < from: CT Cervical Spine No Cont (01.16.23 @ 08:23) >  Head CT: No evidence for intracranial hemorrhage, mass effect, or   displaced calvarial fracture. Mild right frontal scalp edema.    Cervical spine CT: No evidence for acute displaced fracture or traumatic   malalignment. Cervical degenerative spondylosis, as described above.

## 2023-01-18 NOTE — CONSULT NOTE ADULT - PROBLEM SELECTOR RECOMMENDATION 9
-better  -no cx sent  -hypothermia likely from being outside  -day 3 of abx  -cont CTX 1 gm iv q24 - total 5 days   -if febrile or unstable, please check cx

## 2023-01-18 NOTE — DISCHARGE NOTE PROVIDER - NSDCCPCAREPLAN_GEN_ALL_CORE_FT
PRINCIPAL DISCHARGE DIAGNOSIS  Diagnosis: Pneumonia  Assessment and Plan of Treatment: Pneumonia is a lung infection that can cause a fever, cough, and trouble breathing.  Continue all antibiotics as ordered until complete.  Nutrition is important, eat small frequent meals.  Get lots of rest and drink fluids.  Call your health care provider upon arrival home from hospital and make a follow up appointment for one week.  If your cough worsens, you develop fever greater than 101', you have shaking chills, a fast heartbeat, trouble breathing and/or feel your are breathing much faster than usual, call your healthcare provider.  Make sure you wash your hands frequently.        SECONDARY DISCHARGE DIAGNOSES  Diagnosis: Severe aortic stenosis  Assessment and Plan of Treatment: Structural heart team was consulted for TAVR procedure.  Patient is at high surgical risk due to age and frailty, and this also puts her a significant risk for TAVR. She would need a cardiac catheterization and Cardiac CT if she and family wishes to proceed.

## 2023-01-18 NOTE — DISCHARGE NOTE PROVIDER - CARE PROVIDER_API CALL
Antony Dean)  [facility]  [phone] [place] [address] [id number]  Phone: [phone]  Fax: [phone]  Follow Up Time:

## 2023-01-18 NOTE — PROGRESS NOTE ADULT - ASSESSMENT
89 yo F     no PMHx , on no  meds . as  per  daughter   found outside on ground, presenting via EMS./ unknown how pt came to this location.   pt t unable to provide   any history    has contusion/abrasion to forehead.,  alert  now/ seen in er. unable to give  hx    daughter  at  bedside,  states, pt    has   been falling   lately    denies  cp/sob/abd  pain/ fevers   was hypothermic   on arrival. on  warming  blanket     *  admitted with  fall/?  syncope   ct   head,  no bleed   per daughter  pt  with recent  falls   was  hypothermic  on arrival /  sbo in  low 100/'s. ?  sepsis  on arrival    talya  empiric  iv rocephin/   for  probable aspiration  pna  ID eval   cxr.  with aspiration   rpt potassium/  pelvic  xray pending  suspect  pt has  undiagnosed  underlying  dementia ,  now  with frequent  falls/  wandering / agiattio  seen by psych   on dvt ppx   on iv rocephin,/ ID    d r maru         rad< from: CT Chest w/ IV Cont (01.16.23 @ 08:23) >  IMPRESSION:  No acute traumatic pathology in the chest abdomen or pelvis.  Secretions/debris in the trachea and left mainstem bronchus. Correlate   clinically for aspiration.  Chronic findings as above.  --- End of Report ---       89 yo F     no PMHx , on no  meds . as  per  daughter   found outside on ground, presenting via EMS./ unknown how pt came to this location.   pt t unable to provide   any history    has contusion/abrasion to forehead.,  alert  now/ seen in er. unable to give  hx    daughter  at  bedside,  states, pt    has   been falling   lately    denies  cp/sob/abd  pain/ fevers   was hypothermic   on arrival. on  warming  blanket     *  admitted with  fall/?  syncope   ct   head,  no bleed   per daughter  pt  with recent  falls   was  hypothermic  on arrival /  sbo in  low 100/'s.,  from lying outside  in the  cold.  not  from  sepsis, PER   ID   n  empiric  iv rocephin/   for  probable aspiration  pna  ID eval   cxr.  with aspiration  suspect  pt has  undiagnosed  underlying  dementia ,  now  with frequent  falls/  wandering / agiattio  seen by psych   on dvt ppx   on iv rocephin,/ for  5 days per  ID    d r maru  daughter   updated, wants pt home on friday         rad< from: CT Chest w/ IV Cont (01.16.23 @ 08:23) >  IMPRESSION:  No acute traumatic pathology in the chest abdomen or pelvis.  Secretions/debris in the trachea and left mainstem bronchus. Correlate   clinically for aspiration.  Chronic findings as above.  --- End of Report ---

## 2023-01-18 NOTE — DISCHARGE NOTE PROVIDER - NSDCFUADDINST_GEN_ALL_CORE_FT
Take your medications as directed   Followup with Dr Dean - call day after discharge to make an appt. to be seen within 7-10 days

## 2023-01-18 NOTE — DISCHARGE NOTE PROVIDER - HOSPITAL COURSE
89 yo F     no PMHx , on no  meds . as  per  daughter   found outside on ground, presenting via EMS./ unknown how pt came to this location.   pt t unable to provide   any history    has contusion/abrasion to forehead.,  alert  now/ seen in er. unable to give  hx    daughter  at  bedside,  states, pt    has   been falling   lately    denies  cp/sob/abd  pain/ fevers   was hypothermic   on arrival. on  warming  blanket     *  admitted with  fall/?  syncope   ct   head,  no bleed   per daughter  pt  with recent  falls   was  hypothermic  on arrival /   from  lying  outside in cold,  not  from  sepsis per  ID.     empiric  iv rocephin/   for  probable aspiration  pna  ID eval   cxr.  with  aspiration     suspect  pt has  undiagnosed  underlying  dementia ,  now  with frequent  falls/  wandering / agiattion  seen by psych   on dvt ppx   on iv rocephin,/   for  5 days / ID    d r maru    f/p  dr harris 89 yo F     no PMHx , on no  meds . as  per  daughter   found outside on ground, presenting via EMS./ unknown how pt came to this location.   pt t unable to provide   any history    has contusion/abrasion to forehead.,  alert  now/ seen in er. unable to give  hx    daughter  at  bedside,  states, pt    has   been falling   lately    denies  cp/sob/abd  pain/ fevers   was hypothermic   on arrival. on  warming  blanket     *  admitted with  fall/?  syncope   ct   head,  no bleed   per daughter  pt  with recent  falls   was  hypothermic  on arrival /  sbo in  low 100/'s.,  from lying outside  in the  cold.  not  from  sepsis, PER   ID   n  empiric  iv rocephin/   for  probable aspiration  pna  ID eval   cxr.  with aspiration  suspect  pt has  undiagnosed  underlying  dementia ,  now  with frequent  falls/  wandering / agiattio  seen by psych   on dvt ppx   on iv rocephin,/ for  5 days per  ID    d r maru    echo,, normal  ef/  severe  AS   at age 89, will  not pursue  any intervention    daughter  wants  tavr eval/  and today, daughter  stated,  that spoke with Rex/ and  was told  TAVR attending  would  call her  and  is  waitiung  for  that call and only then  will decide  on   d/c  planning

## 2023-01-19 DIAGNOSIS — I35.0 NONRHEUMATIC AORTIC (VALVE) STENOSIS: ICD-10-CM

## 2023-01-19 PROCEDURE — 99232 SBSQ HOSP IP/OBS MODERATE 35: CPT

## 2023-01-19 PROCEDURE — 99223 1ST HOSP IP/OBS HIGH 75: CPT

## 2023-01-19 RX ADMIN — HEPARIN SODIUM 5000 UNIT(S): 5000 INJECTION INTRAVENOUS; SUBCUTANEOUS at 10:16

## 2023-01-19 RX ADMIN — HEPARIN SODIUM 5000 UNIT(S): 5000 INJECTION INTRAVENOUS; SUBCUTANEOUS at 21:06

## 2023-01-19 RX ADMIN — CEFTRIAXONE 100 MILLIGRAM(S): 500 INJECTION, POWDER, FOR SOLUTION INTRAMUSCULAR; INTRAVENOUS at 17:11

## 2023-01-19 NOTE — CONSULT NOTE ADULT - NS ATTEND AMEND GEN_ALL_CORE FT
Events that transpired leading to the patient's current hospitalization reviewed along with the patient's hospital course.  The patient's past medical history/surgical history/family history/social history was gone over.  Agree with 14 point review of systems and physical examination as noted above.    Discussion was had with the patient and her daughter/Lidia (who was contacted on the phone) concerning her clinical presentation, associated signs and symptoms leading to her hospitalization and imaging studies.    The patient has severe aortic valve stenosis.  Discussion was had what is severe aortic valve stenosis.  The associated signs and symptoms and the natural pathophysiology if left untreated was gone over.  The various treatment options for severe symptomatic aortic valve stenosis was gone over including medical therapy, TAVR and SAVR.  The pros/cons and the risk/benefits of the various treatment options were gone over.  Indications and details for TAVR was gone over.  The necessary work-up was reviewed.  Risks of the TAVR procedure were gone over which include but not limited to infection, bleeding, arrhythmia/stroke, TIA/stroke, worsening renal insufficiency/need for dialysis, hemodynamic instability, need for urgent surgery and death    At the time of examination the patient expressed that she understood that she has a severe aortic valve stenosis and understands that it may impact her quality of life along with her longevity.  However she does not want any invasive procedure and wishes to be treated in a conservative/medical fashion.  The patient's daughter also reported that she had a similar conversation with her mother as well.    All questions concerns of the patient and her daughter/Lidia were addressed.    Findings and plan were discussed with cardiology/Dr. Pizarro and medicine/Dr. Betts teams.

## 2023-01-19 NOTE — CONSULT NOTE ADULT - SUBJECTIVE AND OBJECTIVE BOX
Structural Heart Team    HPI:    The patient is a 89 y/o female who was brought in to the hospital by EMS, after being found on the ground after an unwitnessed fall/syncope. The patient does not remember how she wound up on the floor. She is currently aware of person, place and time but is a poor historian. As per the patient, she lives at home with her , and is independent in her ADL's, but patient was unable to answer questions regarding activity or symptoms associated symptoms, I placed a call to her daughter Lidia to verify history, and awaiting call back. Patient does normally ambulate with cane.    STS: 4.55%          01-18 @ 07:01  -  01-19 @ 07:00  --------------------------------------------------------  IN: 300 mL / OUT: 0 mL / NET: 300 mL        PAST MEDICAL & SURGICAL HISTORY:  HTN (hypertension)          FAMILY HISTORY:  Non Contibutory            lidocaine (Unknown)    cefTRIAXone   IVPB 1000 milliGRAM(s) IV Intermittent every 24 hours  heparin   Injectable 5000 Unit(s) SubCutaneous every 12 hours      T(C): 36.7 (01-19-23 @ 10:48), Max: 36.7 (01-19-23 @ 05:09)  HR: 77 (01-19-23 @ 10:48) (76 - 82)  BP: 134/62 (01-19-23 @ 10:48) (134/62 - 156/75)  RR: 18 (01-19-23 @ 10:48) (18 - 18)  SpO2: 99% (01-19-23 @ 10:48) (95% - 99%)  Wt(kg): --      Review of Symptoms:  General: Alert, Follows commands  Respiratory:  Currently denies  Cardiac: Denies CP, Denies Palpitations  Gastrointestinal: Denies Pain, Denies N/V  Extremities: Denies Pedal Edema, No Joint pain  Vascular: Negative  Neurological: ? Syncope  Endocrine: negative      Physical Exam:  Gen: A/Ox3, NAD  HEENT: No JVD, Neck Supple, Trachea midline, No masses, Ecchymosis and abrasion over right eye  Pulmonary: CTAB,  No accessory muscle use for respiration  Cardiac: S1S2, RRR, II/VI JAKE   No Gallops/Rubs  ECG: SR  Gastrointestinal: Soft, NT/ND, + Bowel Sounds  Extremities: No Edema, + R Knee pain with ecchymosis and abrasion, +PMSx4  Vascular: 1+ pulses B/L, No Bruits  Neurological: Non Focal, = motor and sensory      Laboratory:                        10.7   4.93  )-----------( 240      ( 18 Jan 2023 10:27 )             34.2             Pro-BNP:          Transthoracic Echo:    < from: Transthoracic Echocardiogram (01.18.23 @ 14:18) >  EF (Smith Rule): 64 %Doppler Peak Velocity (m/sec):  AoV=4.5  ------------------------------------------------------------------------  Observations:  Mitral Valve: Mitral annular calcification and calcified  mitral leaflets. Mild mitral regurgitation.  Aortic Valve/Aorta: Calcified trileaflet aortic valve with  decreased opening. Peak transaortic valve gradientequals  81 mm Hg, mean transaortic valve gradient equals 49 mm Hg,  estimated aortic valve area equals 0.7 sqcm (by continuity  equation), aortic valve velocity time integral equals 119  cm, consistent with severe aortic stenosis. Moderate aortic  regurgitation. Peak left ventricular outflow tract gradient  equals 6 mm Hg, mean gradient is equal to 4 mm Hg, LVOT  velocity time integral equals 32 cm.  Aortic Root: 3.1 cm.  Ascending Aorta: 3.2 cm.  LVOT diameter: 1.8 cm.  Left Atrium: Severely dilated left atrium.  LA volume index  = 49 cc/m2.  Left Ventricle: Normal left ventricular systolic function.  No segmental wall motion abnormalities. Increased relative  wall thickness with normal left ventricular mass index,  consistent with concentric left ventricular remodeling.  Moderate diastolic dysfunction (Stage II).  Right Heart: Normal right atrium. Normal right ventricular  size and function. Normal tricuspid valve. Minimal  tricuspid regurgitation. Pulmonic valve not well  visualized, probably normal. Mild-moderate pulmonic  regurgitation.  Pericardium/Pleura: Normal pericardium with no pericardial  effusion.  Hemodynamic: Estimated right atrial pressure is 8 mm Hg.  ------------------------------------------------------------------------  Conclusions:  1. Calcified trileaflet aortic valve with decreased  opening. Peak transaortic valve gradient equals 81 mm Hg,  mean transaortic valve gradient equals 49 mm Hg, estimated  aortic valve area equals 0.7 sqcm (by continuity equation),  aortic valve velocity time integral equals 119 cm,  consistent with severe aortic stenosis. Moderate aortic  regurgitation.  2. Increased relative wall thickness with normal left  ventricular mass index, consistent with concentric left  ventricular remodeling.  3. Normal left ventricular systolic function. No segmental  wall motion abnormalities.  4. Moderate diastolic dysfunction (Stage II).  5. Normal right ventricular size and function.  *** No previous Echo exam.    < end of copied text >   Structural Heart Team    HPI:    The patient is a 89 y/o female who was brought in to the hospital by EMS, after being found on the ground after an unwitnessed fall/syncope. The patient does not remember how she wound up on the floor. She is currently aware of person, place and time but is a poor historian. As per the patient, she states she lives at home with her , and is independent in her ADL's, but patient was unable to answer questions regarding activity or symptoms associated symptoms, Patient does normally ambulate with cane.    I spoke with patient's daughter Lidia on the phone to get more of a history. She is not sure why her mother failed, however over the past few months, she has been getting dizzy with activity and more fatigued. She does have periods of confusion, more so at night, however she is easily redirected, as when she reverts to thinking she is in her childhood home. She lives at home by herself, with her daughter coming over often to help, though she is independent in her ADL's. Though her  passed some time ago, she still tells people she lives with him as per her daughter. Zoie gets Chronic UTI's, and was hospitalized in November of 2022 for a UTI, however she has not had any episodes of sepsis.    STS: 4.55%          01-18 @ 07:01  -  01-19 @ 07:00  --------------------------------------------------------  IN: 300 mL / OUT: 0 mL / NET: 300 mL        PAST MEDICAL & SURGICAL HISTORY:  HTN (hypertension)          FAMILY HISTORY:  Non Contibutory            lidocaine (Unknown)    cefTRIAXone   IVPB 1000 milliGRAM(s) IV Intermittent every 24 hours  heparin   Injectable 5000 Unit(s) SubCutaneous every 12 hours      T(C): 36.7 (01-19-23 @ 10:48), Max: 36.7 (01-19-23 @ 05:09)  HR: 77 (01-19-23 @ 10:48) (76 - 82)  BP: 134/62 (01-19-23 @ 10:48) (134/62 - 156/75)  RR: 18 (01-19-23 @ 10:48) (18 - 18)  SpO2: 99% (01-19-23 @ 10:48) (95% - 99%)  Wt(kg): --      Review of Symptoms:  General: Alert, Follows commands  Respiratory:  Currently denies  Cardiac: Denies CP, Denies Palpitations  Gastrointestinal: Denies Pain, Denies N/V  Extremities: Denies Pedal Edema, No Joint pain  Vascular: Negative  Neurological: ? Syncope  Endocrine: negative      Physical Exam:  Gen: A/Ox3, NAD  HEENT: No JVD, Neck Supple, Trachea midline, No masses, Ecchymosis and abrasion over right eye  Pulmonary: CTAB,  No accessory muscle use for respiration  Cardiac: S1S2, RRR, II/VI JAKE   No Gallops/Rubs  ECG: SR  Gastrointestinal: Soft, NT/ND, + Bowel Sounds  Extremities: No Edema, + R Knee pain with ecchymosis and abrasion, +PMSx4  Vascular: 1+ pulses B/L, No Bruits  Neurological: Non Focal, = motor and sensory      Laboratory:                        10.7   4.93  )-----------( 240      ( 18 Jan 2023 10:27 )             34.2             Pro-BNP:          Transthoracic Echo:    < from: Transthoracic Echocardiogram (01.18.23 @ 14:18) >  EF (Smith Rule): 64 %Doppler Peak Velocity (m/sec):  AoV=4.5  ------------------------------------------------------------------------  Observations:  Mitral Valve: Mitral annular calcification and calcified  mitral leaflets. Mild mitral regurgitation.  Aortic Valve/Aorta: Calcified trileaflet aortic valve with  decreased opening. Peak transaortic valve gradientequals  81 mm Hg, mean transaortic valve gradient equals 49 mm Hg,  estimated aortic valve area equals 0.7 sqcm (by continuity  equation), aortic valve velocity time integral equals 119  cm, consistent with severe aortic stenosis. Moderate aortic  regurgitation. Peak left ventricular outflow tract gradient  equals 6 mm Hg, mean gradient is equal to 4 mm Hg, LVOT  velocity time integral equals 32 cm.  Aortic Root: 3.1 cm.  Ascending Aorta: 3.2 cm.  LVOT diameter: 1.8 cm.  Left Atrium: Severely dilated left atrium.  LA volume index  = 49 cc/m2.  Left Ventricle: Normal left ventricular systolic function.  No segmental wall motion abnormalities. Increased relative  wall thickness with normal left ventricular mass index,  consistent with concentric left ventricular remodeling.  Moderate diastolic dysfunction (Stage II).  Right Heart: Normal right atrium. Normal right ventricular  size and function. Normal tricuspid valve. Minimal  tricuspid regurgitation. Pulmonic valve not well  visualized, probably normal. Mild-moderate pulmonic  regurgitation.  Pericardium/Pleura: Normal pericardium with no pericardial  effusion.  Hemodynamic: Estimated right atrial pressure is 8 mm Hg.  ------------------------------------------------------------------------  Conclusions:  1. Calcified trileaflet aortic valve with decreased  opening. Peak transaortic valve gradient equals 81 mm Hg,  mean transaortic valve gradient equals 49 mm Hg, estimated  aortic valve area equals 0.7 sqcm (by continuity equation),  aortic valve velocity time integral equals 119 cm,  consistent with severe aortic stenosis. Moderate aortic  regurgitation.  2. Increased relative wall thickness with normal left  ventricular mass index, consistent with concentric left  ventricular remodeling.  3. Normal left ventricular systolic function. No segmental  wall motion abnormalities.  4. Moderate diastolic dysfunction (Stage II).  5. Normal right ventricular size and function.  *** No previous Echo exam.    < end of copied text >

## 2023-01-19 NOTE — CONSULT NOTE ADULT - SUBJECTIVE AND OBJECTIVE BOX
HPI:  89 yo F     with no PMHx , on no  meds . as  per  daughter   found outside on ground, presenting via EMS.   unknown how pt came to this location./pt t unable to provide   any history    has contusion/abrasion to forehead.  alert  now/ seen in er. unable to give  hx    daughter  at  bedside,  states, pt    has   been falling   lately    denies  cp/sob/abd  pain/ fevers   was hypothermic   on arrival. on  warming  balnket  (16 Jan 2023 12:00)    PAST MEDICAL & SURGICAL HISTORY:  HTN (hypertension)          lidocaine (Unknown)      FAMILY HISTORY:    Social History:    Medications:  cefTRIAXone   IVPB 1000 milliGRAM(s) IV Intermittent every 24 hours  heparin   Injectable 5000 Unit(s) SubCutaneous every 12 hours    Labs:                        10.7   4.93  )-----------( 240      ( 18 Jan 2023 10:27 )             34.2           Radiology:             ROS:  No pain, no fever  No lumps in neck or pain  No Sob or chest pain  No palpitations   No abdominal pain. No change in bowel habit. No blood in stools  No weakness in extremities  No leg swelling  Rest of comprehensive ROS was negative    Vital Signs Last 24 Hrs  T(C): 36.7 (19 Jan 2023 05:09), Max: 36.7 (19 Jan 2023 05:09)  T(F): 98 (19 Jan 2023 05:09), Max: 98 (19 Jan 2023 05:09)  HR: 76 (19 Jan 2023 05:09) (76 - 82)  BP: 154/67 (19 Jan 2023 05:09) (154/67 - 156/75)  BP(mean): --  RR: 18 (19 Jan 2023 05:09) (18 - 18)  SpO2: 95% (19 Jan 2023 05:09) (95% - 95%)    Parameters below as of 19 Jan 2023 05:09  Patient On (Oxygen Delivery Method): room air        Physical Exam:  Patient comfortable  AXOX3  Neck supple, no LN's  Chest: bilateral breath sounds, no wheeze or rales  CVS: regular heart rate without murmur  Abdomen: soft, BS+, no massess or organomegaly  CNS: no gross deficit  Musculoskeletal: Normal range of motion  Skin: no rash

## 2023-01-19 NOTE — PROGRESS NOTE ADULT - ASSESSMENT
89 yo F with no PMHx , on no  meds . as  per  daughter found outside on ground, presenting via EMS with hypothermia     Travis Fitzgerald  Attending Physician   Division of Infectious Disease  Office #[phone]  Available on [employer] Teams also  After 5pm/weekend or no response, call #[phone]

## 2023-01-19 NOTE — CONSULT NOTE ADULT - ASSESSMENT
89 yo F w/ unknown PMHx found outside on ground, presenting via EMS. Unknown how pt came to this location. VSS per EMS. Pt unable to provide sig PMHx due to ams. No hx known per EMS. Pt not in possession of significant information regarding I.D. or med hx, or contacts. Contusion/abrasion to forehead. Alert but not answering questions.  pt with hx of dementia with multiple falls >syncope recently while standing  can not get any hx from  the pt.  ecg noted narrow qrs no sign of conduction diseases check orthostatic  pt with hx of Mitral Regurgitation as per daughter, not a candidate for any aggressive measures  tsh/ b12 level  physical therapy  dvt prophylaxis
89 y/o female with severe Aortic Stenosis
89 yo F with no PMHx , on no  meds . as  per  daughter found outside on ground, presenting via EMS with hypothermia     Travis Fitzgerald  Attending Physician   Division of Infectious Disease  Office #[phone]  Available on [employer] Teams also  After 5pm/weekend or no response, call #[phone]

## 2023-01-19 NOTE — CONSULT NOTE ADULT - PROBLEM SELECTOR RECOMMENDATION 9
We were asked to evaluate the patient for possible TAVR procedure. She is a poor historian, and waiting to speak with her daughter to try and elucidate a better history. Patient is at high surgical risk due to age and frailty, and this also puts her a significant risk for TAVR. She would need a cardiac catheterization and Cardiac CT if she and family wishes to proceed. The patient is [place], but when I tried to explain about her valve problem in simple terms, she did not appear to understand what I was trying to say. Will discuss with Dr. Mills, and one of us will speak with her daughter to explain risks of the workup and procedure, which may outweigh the benefit. We will continue to follow for now. We were asked to evaluate the patient for possible TAVR procedure. She is a poor historian, and waiting to speak with her daughter to try and elucidate a better history. Patient is at high surgical risk due to age and frailty, and this also puts her a significant risk for TAVR. She would need a cardiac catheterization and Cardiac CT if she and family wishes to proceed. The patient is [place], but when I tried to explain about her valve problem in simple terms, she did not appear to understand what I was trying to say. Will discuss with Dr. Mills. I spoke with her daughter at length regarding risks and benefits of Aortic Stenosis and treatment. She still wants to weigh her options. The best number to call her daughter Lidia is her cell phone # [phone].

## 2023-01-20 ENCOUNTER — TRANSCRIPTION ENCOUNTER (OUTPATIENT)
Age: 88
End: 2023-01-20

## 2023-01-20 VITALS
SYSTOLIC BLOOD PRESSURE: 152 MMHG | TEMPERATURE: 99 F | RESPIRATION RATE: 18 BRPM | OXYGEN SATURATION: 97 % | HEART RATE: 72 BPM | DIASTOLIC BLOOD PRESSURE: 78 MMHG

## 2023-01-20 LAB
HCT VFR BLD CALC: 35.2 % — SIGNIFICANT CHANGE UP (ref 34.5–45)
HGB BLD-MCNC: 11 G/DL — LOW (ref 11.5–15.5)
MCHC RBC-ENTMCNC: 26.3 PG — LOW (ref 27–34)
MCHC RBC-ENTMCNC: 31.3 GM/DL — LOW (ref 32–36)
MCV RBC AUTO: 84.2 FL — SIGNIFICANT CHANGE UP (ref 80–100)
NRBC # BLD: 0 /100 WBCS — SIGNIFICANT CHANGE UP (ref 0–0)
PLATELET # BLD AUTO: 253 K/UL — SIGNIFICANT CHANGE UP (ref 150–400)
RBC # BLD: 4.18 M/UL — SIGNIFICANT CHANGE UP (ref 3.8–5.2)
RBC # FLD: 13.5 % — SIGNIFICANT CHANGE UP (ref 10.3–14.5)
SARS-COV-2 RNA SPEC QL NAA+PROBE: SIGNIFICANT CHANGE UP
WBC # BLD: 5.15 K/UL — SIGNIFICANT CHANGE UP (ref 3.8–10.5)
WBC # FLD AUTO: 5.15 K/UL — SIGNIFICANT CHANGE UP (ref 3.8–10.5)

## 2023-01-20 PROCEDURE — 85610 PROTHROMBIN TIME: CPT

## 2023-01-20 PROCEDURE — U0005: CPT

## 2023-01-20 PROCEDURE — 80048 BASIC METABOLIC PNL TOTAL CA: CPT

## 2023-01-20 PROCEDURE — 82803 BLOOD GASES ANY COMBINATION: CPT

## 2023-01-20 PROCEDURE — 81001 URINALYSIS AUTO W/SCOPE: CPT

## 2023-01-20 PROCEDURE — 87637 SARSCOV2&INF A&B&RSV AMP PRB: CPT

## 2023-01-20 PROCEDURE — 83690 ASSAY OF LIPASE: CPT

## 2023-01-20 PROCEDURE — 85014 HEMATOCRIT: CPT

## 2023-01-20 PROCEDURE — 82947 ASSAY GLUCOSE BLOOD QUANT: CPT

## 2023-01-20 PROCEDURE — 70450 CT HEAD/BRAIN W/O DYE: CPT | Mod: MA

## 2023-01-20 PROCEDURE — 71260 CT THORAX DX C+: CPT | Mod: MA

## 2023-01-20 PROCEDURE — 82330 ASSAY OF CALCIUM: CPT

## 2023-01-20 PROCEDURE — 85730 THROMBOPLASTIN TIME PARTIAL: CPT

## 2023-01-20 PROCEDURE — 97116 GAIT TRAINING THERAPY: CPT

## 2023-01-20 PROCEDURE — 72125 CT NECK SPINE W/O DYE: CPT | Mod: MA

## 2023-01-20 PROCEDURE — 82550 ASSAY OF CK (CPK): CPT

## 2023-01-20 PROCEDURE — 84132 ASSAY OF SERUM POTASSIUM: CPT

## 2023-01-20 PROCEDURE — 86850 RBC ANTIBODY SCREEN: CPT

## 2023-01-20 PROCEDURE — 82435 ASSAY OF BLOOD CHLORIDE: CPT

## 2023-01-20 PROCEDURE — 96372 THER/PROPH/DIAG INJ SC/IM: CPT

## 2023-01-20 PROCEDURE — 84295 ASSAY OF SERUM SODIUM: CPT

## 2023-01-20 PROCEDURE — 82607 VITAMIN B-12: CPT

## 2023-01-20 PROCEDURE — 90715 TDAP VACCINE 7 YRS/> IM: CPT

## 2023-01-20 PROCEDURE — 97162 PT EVAL MOD COMPLEX 30 MIN: CPT

## 2023-01-20 PROCEDURE — 86901 BLOOD TYPING SEROLOGIC RH(D): CPT

## 2023-01-20 PROCEDURE — 85018 HEMOGLOBIN: CPT

## 2023-01-20 PROCEDURE — 84443 ASSAY THYROID STIM HORMONE: CPT

## 2023-01-20 PROCEDURE — 83605 ASSAY OF LACTIC ACID: CPT

## 2023-01-20 PROCEDURE — 85025 COMPLETE CBC W/AUTO DIFF WBC: CPT

## 2023-01-20 PROCEDURE — U0003: CPT

## 2023-01-20 PROCEDURE — 92610 EVALUATE SWALLOWING FUNCTION: CPT

## 2023-01-20 PROCEDURE — 74177 CT ABD & PELVIS W/CONTRAST: CPT | Mod: MA

## 2023-01-20 PROCEDURE — 82746 ASSAY OF FOLIC ACID SERUM: CPT

## 2023-01-20 PROCEDURE — 97530 THERAPEUTIC ACTIVITIES: CPT

## 2023-01-20 PROCEDURE — 85027 COMPLETE CBC AUTOMATED: CPT

## 2023-01-20 PROCEDURE — 36415 COLL VENOUS BLD VENIPUNCTURE: CPT

## 2023-01-20 PROCEDURE — 93306 TTE W/DOPPLER COMPLETE: CPT

## 2023-01-20 PROCEDURE — 99291 CRITICAL CARE FIRST HOUR: CPT | Mod: 25

## 2023-01-20 PROCEDURE — 80307 DRUG TEST PRSMV CHEM ANLYZR: CPT

## 2023-01-20 PROCEDURE — 86900 BLOOD TYPING SEROLOGIC ABO: CPT

## 2023-01-20 PROCEDURE — 80053 COMPREHEN METABOLIC PANEL: CPT

## 2023-01-20 RX ADMIN — CEFTRIAXONE 100 MILLIGRAM(S): 500 INJECTION, POWDER, FOR SOLUTION INTRAMUSCULAR; INTRAVENOUS at 16:48

## 2023-01-20 RX ADMIN — HEPARIN SODIUM 5000 UNIT(S): 5000 INJECTION INTRAVENOUS; SUBCUTANEOUS at 10:25

## 2023-01-20 NOTE — PROGRESS NOTE ADULT - ASSESSMENT
89 yo F     no PMHx , on no  meds . as  per  daughter   found outside on ground, presenting via EMS./ unknown how pt came to this location.   pt t unable to provide   any history    has contusion/abrasion to forehead.,  alert  now/ seen in er. unable to give  hx    daughter  at  bedside,  states, pt    has   been falling   lately    denies  cp/sob/abd  pain/ fevers   was hypothermic   on arrival. on  warming  blanket     *  admitted with  fall/?  syncope   ct   head,  no bleed   per daughter  pt  with recent  falls   was  hypothermic  on arrival /  sbo in  low 100/'s.,  from lying outside  in the  cold.  not  from  sepsis, PER   ID   n  empiric  iv rocephin/   for  probable aspiration  pna  ID eval   cxr.  with aspiration  suspect  pt has  undiagnosed  underlying  dementia ,  now  with frequent  falls/  wandering / agiattio  seen by psych   on dvt ppx   on iv rocephin,/ for  5 days per  ID    d r maru    echo,, normal  ef/  severe  AS   at age 89, will  not pursue  any intervention    daughter  wants  tavr eval/  and today, daughter  stated,  that spoke with Rex/ and  was told  TAVR attending  would  call her  and  is  waitiung  for  that call and only then  will decide  on   d/c  planning            rad< from: CT Chest w/ IV Cont (01.16.23 @ 08:23) >  IMPRESSION:  No acute traumatic pathology in the chest abdomen or pelvis.  Secretions/debris in the trachea and left mainstem bronchus. Correlate   clinically for aspiration.  Chronic findings as above.  --- End of Report ---

## 2023-01-20 NOTE — DISCHARGE NOTE NURSING/CASE MANAGEMENT/SOCIAL WORK - NSDCPEFALRISK_GEN_ALL_CORE
For information on Fall & Injury Prevention, visit: [email or web address] OR  [email or web address] OR  https://www.cdc.gov/steadi/patient.html

## 2023-01-20 NOTE — DISCHARGE NOTE NURSING/CASE MANAGEMENT/SOCIAL WORK - PATIENT PORTAL LINK FT
You can access the FollowMyHealth Patient Portal offered by [facility] by registering at the following website: [email or web address]. By joining [employer]’s FollowMyHealth portal, you will also be able to view your health information using other applications (apps) compatible with our system.

## 2023-01-20 NOTE — DISCHARGE NOTE NURSING/CASE MANAGEMENT/SOCIAL WORK - NSDCVIVACCINE_GEN_ALL_CORE_FT
Tdap; 16-Jan-2023 07:36; William Ramirez (RN); [employer]; [id number] (Exp. Date: 01-Jun-2024); IntraMuscular; Deltoid Right.; 0.5 milliLiter(s); VIS (VIS Published: 09-May-2013, VIS Presented: 16-Jan-2023);

## 2023-01-20 NOTE — PROGRESS NOTE ADULT - REASON FOR ADMISSION
fall/  sycnope

## 2023-01-20 NOTE — PROGRESS NOTE ADULT - SUBJECTIVE AND OBJECTIVE BOX
CARDIOLOGY     PROGRESS  NOTE   ________________________________________________    CHIEF COMPLAINT:Patient is a 89y old  Female who presents with a chief complaint of fall/  sycnope (17 Jan 2023 08:41)  no complain, sitting up in nad  	  REVIEW OF SYSTEMS:  CONSTITUTIONAL: No fever, weight loss, or fatigue  EYES: No eye pain, visual disturbances, or discharge  ENT:  No difficulty hearing, tinnitus, vertigo; No sinus or throat pain  NECK: No pain or stiffness  RESPIRATORY: No cough, wheezing, chills or hemoptysis; No Shortness of Breath  CARDIOVASCULAR: No chest pain, palpitations, passing out, dizziness, or leg swelling  GASTROINTESTINAL: No abdominal or epigastric pain. No nausea, vomiting, or hematemesis; No diarrhea or constipation. No melena or hematochezia.  GENITOURINARY: No dysuria, frequency, hematuria, or incontinence  NEUROLOGICAL: No headaches, memory loss, loss of strength, numbness, or tremors  SKIN: No itching, burning, rashes, or lesions   LYMPH Nodes: No enlarged glands  ENDOCRINE: No heat or cold intolerance; No hair loss  MUSCULOSKELETAL: No joint pain or swelling; No muscle, back, or extremity pain  PSYCHIATRIC: No depression, anxiety, mood swings, or difficulty sleeping  HEME/LYMPH: No easy bruising, or bleeding gums  ALLERGY AND IMMUNOLOGIC: No hives or eczema	    [ ] All others negative	  [x ] Unable to obtain    PHYSICAL EXAM:  T(C): 37.1 (01-18-23 @ 05:01), Max: 37.1 (01-17-23 @ 21:03)  HR: 75 (01-18-23 @ 05:01) (75 - 85)  BP: 155/71 (01-18-23 @ 05:01) (126/66 - 155/71)  RR: 17 (01-18-23 @ 05:01) (17 - 18)  SpO2: 97% (01-18-23 @ 05:01) (93% - 100%)  Wt(kg): --  I&O's Summary    16 Jan 2023 07:01  -  17 Jan 2023 07:00  --------------------------------------------------------  IN: 1260 mL / OUT: 850 mL / NET: 410 mL    17 Jan 2023 07:01  -  18 Jan 2023 06:57  --------------------------------------------------------  IN: 1740 mL / OUT: 2400 mL / NET: -660 mL        Appearance: Normal	  HEENT:   Normal oral mucosa, PERRL, EOMI	  Lymphatic: No lymphadenopathy  Cardiovascular: Normal S1 S2, No JVD, + murmurs, No edema  Respiratory: rhonchi	  Psychiatry: demented  Gastrointestinal:  Soft, Non-tender, + BS	  Skin: No rashes, No ecchymoses, No cyanosis	  Neurologic: Non-focal  Extremities: Normal range of motion, No clubbing, cyanosis or edema  Vascular: Peripheral pulses palpable 2+ bilaterally    MEDICATIONS  (STANDING):  cefTRIAXone   IVPB 1000 milliGRAM(s) IV Intermittent every 24 hours  heparin   Injectable 5000 Unit(s) SubCutaneous every 12 hours  sodium chloride 0.9%. 1000 milliLiter(s) (80 mL/Hr) IV Continuous <Continuous>      TELEMETRY: 	    ECG:  	  RADIOLOGY:  OTHER: 	  	  LABS:	 	    CARDIAC MARKERS:  CARDIAC MARKERS ( 16 Jan 2023 07:36 )  x     / x     / 139 U/L / x     / x                                    9.8    6.15  )-----------( 215      ( 17 Jan 2023 06:34 )             31.5     01-17    137  |  106  |  20  ----------------------------<  86  4.2   |  22  |  0.96    Ca    9.0      17 Jan 2023 06:35    TPro  6.2  /  Alb  3.5  /  TBili  0.2  /  DBili  x   /  AST  36  /  ALT  20  /  AlkPhos  73  01-16    proBNP:   Lipid Profile:   HgA1c:   TSH: Thyroid Stimulating Hormone, Serum: 2.42 uIU/mL (01-17 @ 09:00)    PT/INR - ( 16 Jan 2023 07:36 )   PT: 11.0 sec;   INR: 0.96 ratio         PTT - ( 16 Jan 2023 07:36 )  PTT:28.6 sec    < from: 12 Lead ECG (01.16.23 @ 07:12) >  Diagnosis Line SINUS RHYTHM WITH PREMATURE SUPRAVENTRICULAR COMPLEXES  NONSPECIFIC ST ABNORMALITY  ABNORMAL ECG  WHEN COMPARED WITH ECG OF 15-DEC-2007 19:48,  no  SIGNIFICANT CHANGES HAVE OCCURRED      Assessment and plan  ---------------------------   89 yo F w/ unknown PMHx found outside on ground, presenting via EMS. Unknown how pt came to this location. VSS per EMS. Pt unable to provide sig PMHx due to ams. No hx known per EMS. Pt not in possession of significant information regarding I.D. or med hx, or contacts. Contusion/abrasion to forehead. Alert but not answering questions.  pt with hx of dementia with multiple falls >syncope recently while standing  can not get any hx from  the pt.  ecg noted narrow qrs no sign of conduction diseases check orthostatic  pt with hx of Mitral Regurgitation as per daughter, not a candidate for any aggressive measures  tsh/ b12 level  physical therapy  dvt prophylaxis  decrease hydration   orthostatic negative      	        
           CARDIOLOGY     PROGRESS  NOTE   ________________________________________________    CHIEF COMPLAINT:Patient is a 89y old  Female who presents with a chief complaint of fall/  sycnope (18 Jan 2023 12:48)  no complain  	  REVIEW OF SYSTEMS:  CONSTITUTIONAL: No fever, weight loss, or fatigue  EYES: No eye pain, visual disturbances, or discharge  ENT:  No difficulty hearing, tinnitus, vertigo; No sinus or throat pain  NECK: No pain or stiffness  RESPIRATORY: No cough, wheezing, chills or hemoptysis; No Shortness of Breath  CARDIOVASCULAR: No chest pain, palpitations, passing out, dizziness, or leg swelling  GASTROINTESTINAL: No abdominal or epigastric pain. No nausea, vomiting, or hematemesis; No diarrhea or constipation. No melena or hematochezia.  GENITOURINARY: No dysuria, frequency, hematuria, or incontinence  NEUROLOGICAL: No headaches, memory loss, loss of strength, numbness, or tremors  SKIN: No itching, burning, rashes, or lesions   LYMPH Nodes: No enlarged glands  ENDOCRINE: No heat or cold intolerance; No hair loss  MUSCULOSKELETAL: No joint pain or swelling; No muscle, back, or extremity pain  PSYCHIATRIC: No depression, anxiety, mood swings, or difficulty sleeping  HEME/LYMPH: No easy bruising, or bleeding gums  ALLERGY AND IMMUNOLOGIC: No hives or eczema	    [ x] All others negative	  [ ] Unable to obtain    PHYSICAL EXAM:  T(C): 36.7 (01-19-23 @ 05:09), Max: 36.7 (01-19-23 @ 05:09)  HR: 76 (01-19-23 @ 05:09) (76 - 82)  BP: 154/67 (01-19-23 @ 05:09) (154/67 - 156/75)  RR: 18 (01-19-23 @ 05:09) (18 - 18)  SpO2: 95% (01-19-23 @ 05:09) (95% - 95%)  Wt(kg): --  I&O's Summary    18 Jan 2023 07:01  -  19 Jan 2023 07:00  --------------------------------------------------------  IN: 300 mL / OUT: 0 mL / NET: 300 mL        Appearance: Normal	  HEENT:   Normal oral mucosa, PERRL, EOMI	  Lymphatic: No lymphadenopathy  Cardiovascular: Normal S1 S2, No JVD, + murmurs, No edema  Respiratory: rhonchi  Psychiatry: A & O x 3, Mood & affect appropriate  Gastrointestinal:  Soft, Non-tender, + BS	  Skin: No rashes, No ecchymoses, No cyanosis	  Neurologic: Non-focal  Extremities: Normal range of motion, No clubbing, cyanosis or edema  Vascular: Peripheral pulses palpable 2+ bilaterally    MEDICATIONS  (STANDING):  cefTRIAXone   IVPB 1000 milliGRAM(s) IV Intermittent every 24 hours  heparin   Injectable 5000 Unit(s) SubCutaneous every 12 hours      TELEMETRY: 	    ECG:  	  RADIOLOGY:  OTHER: 	  	  LABS:	 	    CARDIAC MARKERS:                                10.7   4.93  )-----------( 240      ( 18 Jan 2023 10:27 )             34.2           proBNP:   Lipid Profile:   HgA1c:   TSH: Thyroid Stimulating Hormone, Serum: 2.42 uIU/mL (01-18 @ 07:06)  Thyroid Stimulating Hormone, Serum: 2.42 uIU/mL (01-17 @ 09:00)    < from: Transthoracic Echocardiogram (01.18.23 @ 14:18) >  1. Calcified trileaflet aortic valve with decreased  opening. Peak transaortic valve gradient equals 81 mm Hg,  mean transaortic valve gradient equals 49 mm Hg, estimated  aortic valve area equals 0.7 sqcm (by continuity equation),  aortic valve velocity time integral equals 119 cm,  consistent with severe aortic stenosis. Moderate aortic  regurgitation.  2. Increased relative wall thickness with normal left  ventricular mass index, consistent with concentric left  ventricular remodeling.  3. Normal left ventricular systolic function. No segmental  wall motion abnormalities.  4. Moderate diastolic dysfunction (Stage II).  5. Normal right ventricular size and function.      Assessment and plan  ---------------------------   89 yo F w/ unknown PMHx found outside on ground, presenting via EMS. Unknown how pt came to this location. VSS per EMS. Pt unable to provide sig PMHx due to ams. No hx known per EMS. Pt not in possession of significant information regarding I.D. or med hx, or contacts. Contusion/abrasion to forehead. Alert but not answering questions.  pt with hx of dementia with multiple falls >syncope recently while standing  can not get any hx from  the pt.  ecg noted narrow qrs no sign of conduction diseases check orthostatic  pt with hx of Mitral Regurgitation as per daughter, not a candidate for any aggressive measures  tsh/ b12 level  physical therapy  dvt prophylaxis  decrease hydration   orthostatic negative  echo noted with SEVERE AS , not sure pt is a candidate for TAVR with SIG DEMENTIA  avoid hypotension/ diuretics    	        
           CARDIOLOGY     PROGRESS  NOTE   ________________________________________________    CHIEF COMPLAINT:Patient is a 89y old  Female who presents with a chief complaint of fall/  sycnope (19 Jan 2023 11:32)  no complain  	  REVIEW OF SYSTEMS:  CONSTITUTIONAL: No fever, weight loss, or fatigue  EYES: No eye pain, visual disturbances, or discharge  ENT:  No difficulty hearing, tinnitus, vertigo; No sinus or throat pain  NECK: No pain or stiffness  RESPIRATORY: No cough, wheezing, chills or hemoptysis; No Shortness of Breath  CARDIOVASCULAR: No chest pain, palpitations, passing out, dizziness, or leg swelling  GASTROINTESTINAL: No abdominal or epigastric pain. No nausea, vomiting, or hematemesis; No diarrhea or constipation. No melena or hematochezia.  GENITOURINARY: No dysuria, frequency, hematuria, or incontinence  NEUROLOGICAL: No headaches, memory loss, loss of strength, numbness, or tremors  SKIN: No itching, burning, rashes, or lesions   LYMPH Nodes: No enlarged glands  ENDOCRINE: No heat or cold intolerance; No hair loss  MUSCULOSKELETAL: No joint pain or swelling; No muscle, back, or extremity pain  PSYCHIATRIC: No depression, anxiety, mood swings, or difficulty sleeping  HEME/LYMPH: No easy bruising, or bleeding gums  ALLERGY AND IMMUNOLOGIC: No hives or eczema	    [ ] All others negative	  [x ] Unable to obtain    PHYSICAL EXAM:  T(C): 36.7 (01-20-23 @ 04:14), Max: 36.7 (01-19-23 @ 10:48)  HR: 70 (01-20-23 @ 04:14) (70 - 79)  BP: 133/73 (01-20-23 @ 04:14) (132/66 - 134/62)  RR: 18 (01-20-23 @ 04:14) (18 - 18)  SpO2: 96% (01-20-23 @ 04:14) (96% - 99%)  Wt(kg): --  I&O's Summary    19 Jan 2023 07:01  -  20 Jan 2023 07:00  --------------------------------------------------------  IN: 360 mL / OUT: 0 mL / NET: 360 mL        Appearance: Normal	  HEENT:   Normal oral mucosa, PERRL, EOMI	  Lymphatic: No lymphadenopathy  Cardiovascular: Normal S1 S2, No JVD, + murmurs, No edema  Respiratory: rhonchi	  Psychiatry: dementia  Gastrointestinal:  Soft, Non-tender, + BS	  Skin: No rashes, No ecchymoses, No cyanosis	  Neurologic: Non-focal  Extremities: Normal range of motion, No clubbing, cyanosis or edema  Vascular: Peripheral pulses palpable 2+ bilaterally    MEDICATIONS  (STANDING):  cefTRIAXone   IVPB 1000 milliGRAM(s) IV Intermittent every 24 hours  heparin   Injectable 5000 Unit(s) SubCutaneous every 12 hours      TELEMETRY: 	    ECG:  	  RADIOLOGY:  OTHER: 	  	  LABS:	 	    CARDIAC MARKERS:                                11.0   5.15  )-----------( 253      ( 20 Jan 2023 06:48 )             35.2           proBNP:   Lipid Profile:   HgA1c:   TSH: Thyroid Stimulating Hormone, Serum: 2.42 uIU/mL (01-18 @ 07:06)  Thyroid Stimulating Hormone, Serum: 2.42 uIU/mL (01-17 @ 09:00)          Assessment and plan  ---------------------------   89 yo F w/ unknown PMHx found outside on ground, presenting via EMS. Unknown how pt came to this location. VSS per EMS. Pt unable to provide sig PMHx due to ams. No hx known per EMS. Pt not in possession of significant information regarding I.D. or med hx, or contacts. Contusion/abrasion to forehead. Alert but not answering questions.  pt with hx of dementia with multiple falls >syncope recently while standing  can not get any hx from  the pt.  ecg noted narrow qrs no sign of conduction diseases check orthostatic  pt with hx of Mitral Regurgitation as per daughter, not a candidate for any aggressive measures  tsh/ b12 level  physical therapy  dvt prophylaxis  decrease hydration   orthostatic negative  echo noted with SEVERE AS , not sure pt is a candidate for TAVR with SIG DEMENTIA  avoid hypotension/ diuretics  pt with severe dementia    	        
    afberile  REVIEW OF SYSTEMS:  CONSTITUTIONAL: No fever,  no  weight loss  ENT:  No  tinnitus,   no   vertigo  NECK: No pain or stiffness  RESPIRATORY: No cough, wheezing, chills or hemoptysis;    No Shortness of Breath  CARDIOVASCULAR: No chest pain, palpitations, dizziness  GASTROINTESTINAL: No abdominal or epigastric pain. No nausea, vomiting, or hematemesis; No diarrhea  No melena or hematochezia.  GENITOURINARY: No dysuria, frequency, hematuria, or incontinence  NEUROLOGICAL: No headaches  SKIN: No itching,  no   rash  LYMPH Nodes: No enlarged glands  ENDOCRINE: No heat or cold intolerance  MUSCULOSKELETAL: No joint pain or swelling  PSYCHIATRIC: No depression, anxiety  HEME/LYMPH: No easy bruising, or bleeding gums  ALLERGY AND IMMUNOLOGIC: No hives or eczema	    MEDICATIONS  (STANDING):  cefTRIAXone   IVPB 1000 milliGRAM(s) IV Intermittent every 24 hours  heparin   Injectable 5000 Unit(s) SubCutaneous every 12 hours    MEDICATIONS  (PRN):      Vital Signs Last 24 Hrs  T(C): 37.1 (18 Jan 2023 05:01), Max: 37.1 (17 Jan 2023 21:03)  T(F): 98.8 (18 Jan 2023 05:01), Max: 98.8 (18 Jan 2023 05:01)  HR: 75 (18 Jan 2023 05:01) (75 - 85)  BP: 155/71 (18 Jan 2023 05:01) (126/66 - 155/71)  BP(mean): --  RR: 17 (18 Jan 2023 05:01) (17 - 18)  SpO2: 97% (18 Jan 2023 05:01) (93% - 100%)    Parameters below as of 18 Jan 2023 05:01  Patient On (Oxygen Delivery Method): room air      CAPILLARY BLOOD GLUCOSE        I&O's Summary    17 Jan 2023 07:01  -  18 Jan 2023 07:00  --------------------------------------------------------  IN: 1740 mL / OUT: 2400 mL / NET: -660 mL          Appearance: Normal	  HEENT:   Normal oral mucosa, PERRL, EOMI	  Lymphatic: No lymphadenopathy  Cardiovascular: Normal S1 S2, No JVD  Respiratory: Lungs clear to auscultation	  Psychiatry: A & O x 3, Mood & affect appropriate  Gastrointestinal:  Soft, Non-tender, + BS	  Skin: No rash, No ecchymoses	  Extremities: Normal range of motion  Vascular: Peripheral pulses palpable bilaterally    LABS:                        9.8    6.15  )-----------( 215      ( 17 Jan 2023 06:34 )             31.5     01-17    137  |  106  |  20  ----------------------------<  86  4.2   |  22  |  0.96    Ca    9.0      17 Jan 2023 06:35    TPro  6.2  /  Alb  3.5  /  TBili  0.2  /  DBili  x   /  AST  36  /  ALT  20  /  AlkPhos  73  01-16 01-16 @ 10:36  4.6  56      Thyroid Stimulating Hormone, Serum: 2.42 uIU/mL (01-17 @ 09:00)          Consultant(s) Notes Reviewed:      Care Discussed with Consultants/Other Providers:    
           CARDIOLOGY     PROGRESS  NOTE   ________________________________________________    CHIEF COMPLAINT:Patient is a 89y old  Female who presents with a chief complaint of fall/  sycnope (16 Jan 2023 21:16)  no complain  	  REVIEW OF SYSTEMS:  CONSTITUTIONAL: No fever, weight loss, or fatigue  EYES: No eye pain, visual disturbances, or discharge  ENT:  No difficulty hearing, tinnitus, vertigo; No sinus or throat pain  NECK: No pain or stiffness  RESPIRATORY: No cough, wheezing, chills or hemoptysis; no Shortness of Breath  CARDIOVASCULAR: No chest pain, palpitations, passing out, dizziness, or leg swelling  GASTROINTESTINAL: No abdominal or epigastric pain. No nausea, vomiting, or hematemesis; No diarrhea or constipation. No melena or hematochezia.  GENITOURINARY: No dysuria, frequency, hematuria, or incontinence  NEUROLOGICAL: No headaches, memory loss, loss of strength, numbness, or tremors  SKIN: No itching, burning, rashes, or lesions   LYMPH Nodes: No enlarged glands  ENDOCRINE: No heat or cold intolerance; No hair loss  MUSCULOSKELETAL: No joint pain or swelling; No muscle, back, or extremity pain  PSYCHIATRIC: No depression, anxiety, mood swings, or difficulty sleeping  HEME/LYMPH: No easy bruising, or bleeding gums  ALLERGY AND IMMUNOLOGIC: No hives or eczema	    [ ] All others negative	  [x ] Unable to obtain    PHYSICAL EXAM:  T(C): 36.8 (01-17-23 @ 05:23), Max: 37.4 (01-16-23 @ 15:00)  HR: 79 (01-17-23 @ 05:23) (74 - 89)  BP: 127/65 (01-17-23 @ 05:23) (92/53 - 127/65)  RR: 18 (01-17-23 @ 05:23) (14 - 20)  SpO2: 96% (01-17-23 @ 05:23) (95% - 98%)  Wt(kg): --  I&O's Summary    16 Jan 2023 07:01  -  17 Jan 2023 07:00  --------------------------------------------------------  IN: 1260 mL / OUT: 850 mL / NET: 410 mL        Appearance: Normal	  HEENT:   Normal oral mucosa, PERRL, EOMI	  Lymphatic: No lymphadenopathy  Cardiovascular: Normal S1 S2, No JVD, + murmurs, No edema  Respiratory:rhonchi  Psychiatry: dementia  Gastrointestinal:  Soft, Non-tender, + BS	  Skin: No rashes, No ecchymoses, No cyanosis	  Extremities: Normal range of motion, No clubbing, cyanosis or edema  Vascular: Peripheral pulses palpable 2+ bilaterally    MEDICATIONS  (STANDING):  cefTRIAXone   IVPB 1000 milliGRAM(s) IV Intermittent every 24 hours  heparin   Injectable 5000 Unit(s) SubCutaneous every 12 hours  sodium chloride 0.9%. 1000 milliLiter(s) (80 mL/Hr) IV Continuous <Continuous>      TELEMETRY: 	    ECG:  	  RADIOLOGY:  OTHER: 	  	  LABS:	 	    CARDIAC MARKERS:  CARDIAC MARKERS ( 16 Jan 2023 07:36 )  x     / x     / 139 U/L / x     / x                                    9.8    6.15  )-----------( 215      ( 17 Jan 2023 06:34 )             31.5     01-17    137  |  106  |  20  ----------------------------<  86  4.2   |  22  |  0.96    Ca    9.0      17 Jan 2023 06:35    TPro  6.2  /  Alb  3.5  /  TBili  0.2  /  DBili  x   /  AST  36  /  ALT  20  /  AlkPhos  73  01-16    proBNP:   Lipid Profile:   HgA1c:   TSH:   PT/INR - ( 16 Jan 2023 07:36 )   PT: 11.0 sec;   INR: 0.96 ratio         PTT - ( 16 Jan 2023 07:36 )  PTT:28.6 sec    < from: 12 Lead ECG (01.16.23 @ 07:12) >  Diagnosis Line SINUS RHYTHM WITH PREMATURE SUPRAVENTRICULAR COMPLEXES  NONSPECIFIC ST ABNORMALITY  ABNORMAL ECG  WHEN COMPARED WITH ECG OF 15-DEC-2007 19:48,  no  SIGNIFICANT CHANGES HAVE OCCURRED      < from: CT Abdomen and Pelvis w/ IV Cont (01.16.23 @ 08:23) >  No acute traumatic pathology in the chest abdomen or pelvis.  Secretions/debris in the trachea and left mainstem bronchus. Correlate   clinically for aspiration.  Chronic findings as above.    < from: CT Head No Cont (01.16.23 @ 08:23) >  Head CT: No evidence for intracranial hemorrhage, mass effect, or   displaced calvarial fracture. Mild right frontal scalp edema.    Cervical spine CT: No evidence for acute displaced fracture or traumatic   malalignment. Cervical degenerative spondylosis, as described above.        Assessment and plan  ---------------------------   89 yo F w/ unknown PMHx found outside on ground, presenting via EMS. Unknown how pt came to this location. VSS per EMS. Pt unable to provide sig PMHx due to ams. No hx known per EMS. Pt not in possession of significant information regarding I.D. or med hx, or contacts. Contusion/abrasion to forehead. Alert but not answering questions.  pt with hx of dementia with multiple falls >syncope recently while standing  can not get any hx from  the pt.  ecg noted narrow qrs no sign of conduction diseases check orthostatic  pt with hx of Mitral Regurgitation as per daughter, not a candidate for any aggressive measures  tsh/ b12 level  physical therapy  dvt prophylaxis  continue hydration  awaiting orthosttaic    	        
    afberile  REVIEW OF SYSTEMS:  CONSTITUTIONAL: No fever,  no  weight loss  ENT:  No  tinnitus,   no   vertigo  NECK: No pain or stiffness  RESPIRATORY: No cough, wheezing, chills or hemoptysis;    No Shortness of Breath  CARDIOVASCULAR: No chest pain, palpitations, dizziness  GASTROINTESTINAL: No abdominal or epigastric pain. No nausea, vomiting, or hematemesis; No diarrhea  No melena or hematochezia.  GENITOURINARY: No dysuria, frequency, hematuria, or incontinence  NEUROLOGICAL: No headaches  SKIN: No itching,  no   rash  LYMPH Nodes: No enlarged glands  ENDOCRINE: No heat or cold intolerance  MUSCULOSKELETAL: No joint pain or swelling  PSYCHIATRIC: No depression, anxiety  HEME/LYMPH: No easy bruising, or bleeding gums  ALLERGY AND IMMUNOLOGIC: No hives or eczema	    MEDICATIONS  (STANDING):  cefTRIAXone   IVPB 1000 milliGRAM(s) IV Intermittent every 24 hours  heparin   Injectable 5000 Unit(s) SubCutaneous every 12 hours  sodium chloride 0.9%. 1000 milliLiter(s) (80 mL/Hr) IV Continuous <Continuous>    MEDICATIONS  (PRN):      Vital Signs Last 24 Hrs  T(C): 36.8 (2023 05:23), Max: 37.4 (2023 15:00)  T(F): 98.3 (2023 05:23), Max: 99.4 (2023 15:00)  HR: 79 (2023 05:23) (74 - 89)  BP: 127/65 (2023 05:23) (92/53 - 127/65)  BP(mean): 86 (2023 18:00) (64 - 86)  RR: 18 (2023 05:23) (14 - 20)  SpO2: 96% (2023 05:23) (95% - 98%)    Parameters below as of 2023 05:23  Patient On (Oxygen Delivery Method): room air      CAPILLARY BLOOD GLUCOSE        I&O's Summary    2023 07:01  -  2023 07:00  --------------------------------------------------------  IN: 1260 mL / OUT: 850 mL / NET: 410 mL          Appearance: Normal	  HEENT:   Normal oral mucosa, PERRL, EOMI	  Lymphatic: No lymphadenopathy  Cardiovascular: Normal S1 S2, No JVD  Respiratory: Lungs clear to auscultation	  Psychiatry: A & O x 3, Mood & affect appropriate  Gastrointestinal:  Soft, Non-tender, + BS	  Skin: No rash, No ecchymoses	  Extremities: Normal range of motion  Vascular: Peripheral pulses palpable bilaterally    LABS:                        9.8    6.15  )-----------( 215      ( 2023 06:34 )             31.5         137  |  106  |  20  ----------------------------<  86  4.2   |  22  |  0.96    Ca    9.0      2023 06:35    TPro  6.2  /  Alb  3.5  /  TBili  0.2  /  DBili  x   /  AST  36  /  ALT  20  /  AlkPhos  73      PT/INR - ( 2023 07:36 )   PT: 11.0 sec;   INR: 0.96 ratio         PTT - ( 2023 07:36 )  PTT:28.6 sec  CARDIAC MARKERS ( 2023 07:36 )  x     / x     / 139 U/L / x     / x          Urinalysis Basic - ( 2023 08:26 )    Color: Colorless / Appearance: Clear / S.010 / pH: x  Gluc: x / Ketone: Negative  / Bili: Negative / Urobili: Negative   Blood: x / Protein: 100 mg/dl / Nitrite: Negative   Leuk Esterase: Negative / RBC: 7 /hpf / WBC 1 /HPF   Sq Epi: x / Non Sq Epi: 0 /hpf / Bacteria: Negative           @ 10:36  4.6  56              Consultant(s) Notes Reviewed:      Care Discussed with Consultants/Other Providers:    
    afebrile  REVIEW OF SYSTEMS:  CONSTITUTIONAL: No fever,  no  weight loss  ENT:  No  tinnitus,   no   vertigo  NECK: No pain or stiffness  RESPIRATORY: No cough, wheezing, chills or hemoptysis;    No Shortness of Breath  CARDIOVASCULAR: No chest pain, palpitations, dizziness  GASTROINTESTINAL: No abdominal or epigastric pain. No nausea, vomiting, or hematemesis; No diarrhea  No melena or hematochezia.  GENITOURINARY: No dysuria, frequency, hematuria, or incontinence  NEUROLOGICAL: No headaches  SKIN: No itching,  no   rash  LYMPH Nodes: No enlarged glands  ENDOCRINE: No heat or cold intolerance  MUSCULOSKELETAL: No joint pain or swelling  PSYCHIATRIC: No depression, anxiety  HEME/LYMPH: No easy bruising, or bleeding gums  ALLERGY AND IMMUNOLOGIC: No hives or eczema	    MEDICATIONS  (STANDING):  cefTRIAXone   IVPB 1000 milliGRAM(s) IV Intermittent every 24 hours  heparin   Injectable 5000 Unit(s) SubCutaneous every 12 hours    MEDICATIONS  (PRN):      Vital Signs Last 24 Hrs  T(C): 36.7 (20 Jan 2023 04:14), Max: 36.7 (19 Jan 2023 10:48)  T(F): 98.1 (20 Jan 2023 04:14), Max: 98.1 (20 Jan 2023 04:14)  HR: 70 (20 Jan 2023 04:14) (70 - 79)  BP: 133/73 (20 Jan 2023 04:14) (132/66 - 134/62)  BP(mean): --  RR: 18 (20 Jan 2023 04:14) (18 - 18)  SpO2: 96% (20 Jan 2023 04:14) (96% - 99%)    Parameters below as of 20 Jan 2023 04:14  Patient On (Oxygen Delivery Method): room air      CAPILLARY BLOOD GLUCOSE        I&O's Summary    19 Jan 2023 07:01  -  20 Jan 2023 07:00  --------------------------------------------------------  IN: 360 mL / OUT: 0 mL / NET: 360 mL          Appearance: Normal	  HEENT:   Normal oral mucosa, PERRL, EOMI	  Lymphatic: No lymphadenopathy  Cardiovascular: Normal S1 S2, No JVD  Respiratory: Lungs clear to auscultation	  Psychiatry: A & O x 3, Mood & affect appropriate  Gastrointestinal:  Soft, Non-tender, + BS	  Skin: No rash, No ecchymoses	  Extremities: Normal range of motion  Vascular: Peripheral pulses palpable bilaterally    LABS:                        11.0   5.15  )-----------( 253      ( 20 Jan 2023 06:48 )             35.2                           Thyroid Stimulating Hormone, Serum: 2.42 uIU/mL (01-18 @ 07:06)          Consultant(s) Notes Reviewed:      Care Discussed with Consultants/Other Providers:    
  afberile    REVIEW OF SYSTEMS:  CONSTITUTIONAL: No fever,  no  weight loss  ENT:  No  tinnitus,   no   vertigo  NECK: No pain or stiffness  RESPIRATORY: No cough, wheezing, chills or hemoptysis;    No Shortness of Breath  CARDIOVASCULAR: No chest pain, palpitations, dizziness  GASTROINTESTINAL: No abdominal or epigastric pain. No nausea, vomiting, or hematemesis; No diarrhea  No melena or hematochezia.  GENITOURINARY: No dysuria, frequency, hematuria, or incontinence  NEUROLOGICAL: No headaches  SKIN: No itching,  no   rash  LYMPH Nodes: No enlarged glands  ENDOCRINE: No heat or cold intolerance  MUSCULOSKELETAL: No joint pain or swelling  PSYCHIATRIC: No depression, anxiety  HEME/LYMPH: No easy bruising, or bleeding gums  ALLERGY AND IMMUNOLOGIC: No hives or eczema	    MEDICATIONS  (STANDING):  cefTRIAXone   IVPB 1000 milliGRAM(s) IV Intermittent every 24 hours  heparin   Injectable 5000 Unit(s) SubCutaneous every 12 hours    MEDICATIONS  (PRN):      Vital Signs Last 24 Hrs  T(C): 36.7 (19 Jan 2023 05:09), Max: 36.7 (19 Jan 2023 05:09)  T(F): 98 (19 Jan 2023 05:09), Max: 98 (19 Jan 2023 05:09)  HR: 76 (19 Jan 2023 05:09) (76 - 82)  BP: 154/67 (19 Jan 2023 05:09) (154/67 - 156/75)  BP(mean): --  RR: 18 (19 Jan 2023 05:09) (18 - 18)  SpO2: 95% (19 Jan 2023 05:09) (95% - 95%)    Parameters below as of 19 Jan 2023 05:09  Patient On (Oxygen Delivery Method): room air      CAPILLARY BLOOD GLUCOSE        I&O's Summary    18 Jan 2023 07:01  -  19 Jan 2023 07:00  --------------------------------------------------------  IN: 300 mL / OUT: 0 mL / NET: 300 mL          Appearance: Normal	  HEENT:   Normal oral mucosa, PERRL, EOMI	  Lymphatic: No lymphadenopathy  Cardiovascular: Normal S1 S2, No JVD  Respiratory: Lungs clear to auscultation	  Psychiatry: A & O x 3, Mood & affect appropriate  Gastrointestinal:  Soft, Non-tender, + BS	  Skin: No rash, No ecchymoses	  Extremities: Normal range of motion  Vascular: Peripheral pulses palpable bilaterally    LABS:                        10.7   4.93  )-----------( 240      ( 18 Jan 2023 10:27 )             34.2                           Thyroid Stimulating Hormone, Serum: 2.42 uIU/mL (01-18 @ 07:06)          Consultant(s) Notes Reviewed:      Care Discussed with Consultants/Other Providers:    
JTEHRO BARDALES 89y [id number]    Patient is a 89y old  Female who presents with a chief complaint of fall/  sycnope (19 Jan 2023 10:11)      Follow Up/CC:  ID following for hypothermia    Interval History/ROS: no fever    Allergies    lidocaine (Unknown)    Intolerances        ANTIMICROBIALS:  cefTRIAXone   IVPB 1000 every 24 hours      MEDICATIONS  (STANDING):  cefTRIAXone   IVPB 1000 milliGRAM(s) IV Intermittent every 24 hours  heparin   Injectable 5000 Unit(s) SubCutaneous every 12 hours    MEDICATIONS  (PRN):        Vital Signs Last 24 Hrs  T(C): 36.7 (19 Jan 2023 10:48), Max: 36.7 (19 Jan 2023 05:09)  T(F): 98 (19 Jan 2023 10:48), Max: 98 (19 Jan 2023 05:09)  HR: 77 (19 Jan 2023 10:48) (76 - 82)  BP: 134/62 (19 Jan 2023 10:48) (134/62 - 156/75)  BP(mean): --  RR: 18 (19 Jan 2023 10:48) (18 - 18)  SpO2: 99% (19 Jan 2023 10:48) (95% - 99%)    Parameters below as of 19 Jan 2023 10:48  Patient On (Oxygen Delivery Method): room air        CBC Full  -  ( 18 Jan 2023 10:27 )  WBC Count : 4.93 K/uL  RBC Count : 4.11 M/uL  Hemoglobin : 10.7 g/dL  Hematocrit : 34.2 %  Platelet Count - Automated : 240 K/uL  Mean Cell Volume : 83.2 fl  Mean Cell Hemoglobin : 26.0 pg  Mean Cell Hemoglobin Concentration : 31.3 gm/dL  Auto Neutrophil # : x  Auto Lymphocyte # : x  Auto Monocyte # : x  Auto Eosinophil # : x  Auto Basophil # : x  Auto Neutrophil % : x  Auto Lymphocyte % : x  Auto Monocyte % : x  Auto Eosinophil % : x  Auto Basophil % : x                MICROBIOLOGY:          v            RADIOLOGY

## 2023-01-21 NOTE — CHART NOTE - NSCHARTNOTEFT_GEN_A_CORE
1 attempt were made to reach patient, which have been unsuccessful. Unable to leave voicemail on 01/21/2023.

## 2023-01-22 NOTE — CHART NOTE - NSCHARTNOTEFT_GEN_A_CORE
2 attempts were made to reach patient, which have been unsuccessful. Unable to leave voicemail on 01/22/2023

## 2023-01-23 ENCOUNTER — TRANSCRIPTION ENCOUNTER (OUTPATIENT)
Age: 88
End: 2023-01-23

## 2023-01-24 ENCOUNTER — TRANSCRIPTION ENCOUNTER (OUTPATIENT)
Age: 88
End: 2023-01-24

## 2023-01-25 ENCOUNTER — TRANSCRIPTION ENCOUNTER (OUTPATIENT)
Age: 88
End: 2023-01-25

## 2023-01-26 ENCOUNTER — TRANSCRIPTION ENCOUNTER (OUTPATIENT)
Age: 88
End: 2023-01-26

## 2023-04-03 NOTE — BH CONSULTATION LIAISON ASSESSMENT NOTE - NSBHMSEATTEN_PSY_A_CORE
Impaired Klisyri Pregnancy And Lactation Text: It is unknown if this medication can harm a developing fetus or if it is excreted in breast milk.

## 2024-03-20 NOTE — CONSULT NOTE ADULT - CONSULT REQUESTED BY NAME
Alpesh
CC: Annual Physical Exam    HPI:   Roxana Ramos is a 57 year old female who presents for a complete physical exam. Symptoms: menopause. Patient complains of nothing.       Wt Readings from Last 6 Encounters:   03/20/24 122 lb (55.3 kg)   07/05/23 120 lb (54.4 kg)   02/22/23 122 lb 4 oz (55.5 kg)   12/06/22 120 lb (54.4 kg)   02/10/22 119 lb (54 kg)   01/19/21 129 lb (58.5 kg)     Body mass index is 21.61 kg/m².     Results for orders placed or performed in visit on 07/05/23   Comp Metabolic Panel (14) [E]   Result Value Ref Range    Glucose 104 (H) 70 - 99 mg/dL    Sodium 141 136 - 145 mmol/L    Potassium 4.1 3.5 - 5.1 mmol/L    Chloride 109 98 - 112 mmol/L    CO2 26.0 21.0 - 32.0 mmol/L    Anion Gap 6 0 - 18 mmol/L    BUN 10 7 - 18 mg/dL    Creatinine 0.56 0.55 - 1.02 mg/dL    Calcium, Total 9.1 8.5 - 10.1 mg/dL    Calculated Osmolality 291 275 - 295 mOsm/kg    eGFR-Cr 107 >=60 mL/min/1.73m2    AST 26 15 - 37 U/L    ALT 23 13 - 56 U/L    Alkaline Phosphatase 51 46 - 118 U/L    Bilirubin, Total 0.4 0.1 - 2.0 mg/dL    Total Protein 7.4 6.4 - 8.2 g/dL    Albumin 3.8 3.4 - 5.0 g/dL    Globulin  3.6 2.8 - 4.4 g/dL    A/G Ratio 1.1 1.0 - 2.0    Patient Fasting for CMP? Yes    Lipid Panel [E]   Result Value Ref Range    Cholesterol, Total 210 (H) <200 mg/dL    HDL Cholesterol 80 (H) 40 - 59 mg/dL    Triglycerides 56 30 - 149 mg/dL    LDL Cholesterol 120 (H) <100 mg/dL    VLDL 10 0 - 30 mg/dL    Non HDL Chol 130 (H) <130 mg/dL    Patient Fasting for Lipid? Yes    Hemoglobin A1C [E]   Result Value Ref Range    HgbA1C 5.9 (H) <5.7 %    Estimated Average Glucose 123 68 - 126 mg/dL   Microalb/Creat Ratio, Random Urine [E]   Result Value Ref Range    Microalbumin, Urine 0.74 mg/dL    Creatinine Ur Random 60.70 mg/dL    Malb/Cre Calc 12.2 <=30.0 ug/mg       Current Outpatient Medications   Medication Sig Dispense Refill    levothyroxine 75 MCG Oral Tab Take 1 tablet (75 mcg total) by mouth daily. 90 tablet 1    Cholecalciferol 
(VITAMIN D) 2000 units Oral Cap Take 2 capsules (4,000 Units total) by mouth daily.      GABAPENTIN 100 MG Oral Cap TAKE 1 CAPSULE BY MOUTH EVERY DAY NIGHTLY (Patient not taking: Reported on 3/20/2024) 90 capsule 1    amoxicillin 500 MG Oral Cap Take 1 capsule (500 mg total) by mouth in the morning and 1 capsule (500 mg total) before bedtime. (Patient not taking: Reported on 2023)      Etodolac 400 MG Oral Tab Take 1 tablet (400 mg total) by mouth in the morning and 1 tablet (400 mg total) before bedtime. (Patient not taking: Reported on 3/20/2024)      HYDROcodone-acetaminophen 5-325 MG Oral Tab Take 1 tablet by mouth every 8 (eight) hours as needed. (Patient not taking: Reported on 3/20/2024)      gabapentin 300 MG Oral Cap Take 1 capsule (300 mg total) by mouth nightly. (Patient not taking: Reported on 3/20/2024)      Diclofenac Sodium 75 MG Oral Tab EC Take 1 tablet (75 mg total) by mouth daily. (Patient not taking: Reported on 3/20/2024) 90 tablet 0      No Known Allergies   Past Medical History:   Diagnosis Date    Carpal tunnel syndrome     History of bone density study 2010    bone density studies dual-energy x-ray absorptiometry    Routine gynecological examination 3/7/2011    visit for: single system exam gynecological with pap smear    Tingling     (paresthesia)    Visit for screening mammogram 2010      Past Surgical History:   Procedure Laterality Date    CYSTOURETHROSCOPY  ~    microhematuria workup ?Dr. Marie - normal          x2    OTHER SURGICAL HISTORY  14    Cystoscopy- Dr. Hurtado    THYROIDECTOMY W RAD NECK SURGERY      thyroid surgery substernal thyroidectomy partial      Family History   Problem Relation Age of Onset    Diabetes Father     Hypertension Father     Cancer Father         kidney    Hypertension Mother       Social History:   Social History     Socioeconomic History    Marital status:    Tobacco Use    Smoking status: Never    Smokeless tobacco: Never 
  Vaping Use    Vaping Use: Never used   Substance and Sexual Activity    Alcohol use: No     Alcohol/week: 0.0 standard drinks of alcohol    Drug use: No   Other Topics Concern    Caffeine Concern No     Comment: none    Exercise No     Comment: rare     Occ: realtor. : y. Children: 2.   Exercise: minimal, three times per week.  Diet: watches fats closely and watches calories closely     REVIEW OF SYSTEMS:   GENERAL: feels well otherwise  SKIN: denies any unusual skin lesions  EYES:denies blurred vision or double vision  HEENT: denies nasal congestion, sinus pain or ST  LUNGS: denies shortness of breath with exertion  CARDIOVASCULAR: denies chest pain on exertion  GI: denies abdominal pain,denies heartburn  : denies dysuria, vaginal discharge or itching,periods -- menopause  MUSCULOSKELETAL: denies back pain  NEURO: denies headaches  PSYCHE: denies depression or anxiety  HEMATOLOGIC: denies hx of anemia  ENDOCRINE:  thyroid history  ALL/ASTHMA: denies hx of allergy or asthma    EXAM:   /70 (BP Location: Left arm, Patient Position: Sitting, Cuff Size: adult)   Pulse 86   Resp 16   Ht 5' 3\" (1.6 m)   Wt 122 lb (55.3 kg)   SpO2 100%   BMI 21.61 kg/m²   Body mass index is 21.61 kg/m².   GENERAL: well developed, well nourished,in no apparent distress  SKIN: no rashes,no suspicious lesions  HEENT: atraumatic, normocephalic,ears and throat clear  EYES:PERRLA, EOMI, conjunctiva are clear  NECK: supple,no adenopathy,no bruits; mild thyromegaly but s/p right thyroidectomy  CHEST: no chest tenderness  BREAST: no dominant or suspicious mass, no nipple discharge; fibrocystic breasts; dense breasts  LUNGS: clear to auscultation  CARDIO: RRR without murmur  GI: good BS's,no masses, HSM or tenderness  : Scant discharge; PAP done; cervix wnl; uterine fibroids; no adnexal masses bilaterally  RECTAL: tone wnl; hemocult negative  MUSCULOSKELETAL: back is not tender,FROM of the back; cyst on right 
thumb  EXTREMITIES: no cyanosis, clubbing or edema  NEURO: Oriented times three,cranial nerves are intact,motor and sensory are grossly intact    ASSESSMENT AND PLAN:   Roxana Ramos is a 57 year old female who presents for a complete physical exam.  Pap and pelvic done.   Mammo is due.   Self breast exam explained.   Health maintenance, will check labs  Last eye exam -- 3/2023  Last dental exam -- 2.2024  To Dr. Noble for pain  control for her back.  Will send mychart about medicine she is taking from [place] for sleep.  Given TdaP today.    Colonoscopy due in 2031.            Encounter Diagnoses   Name Primary?    Routine general medical examination at a health care facility Yes    Screening mammogram for breast cancer     Laboratory examination ordered as part of a routine general medical examination     Screening for genitourinary condition     Prediabetes     Vitamin D deficiency     Need for vaccination     Vaccine counseling     Lumbar radiculopathy     Hypothyroidism due to Hashimoto's thyroiditis     Postoperative hypothyroidism     Screening for malignant neoplasm of cervix     Spinal stenosis of lumbar region without neurogenic claudication     Spinal stenosis of cervical region        Orders Placed This Encounter   Procedures    CBC W Differential W Platelet [E]    Comp Metabolic Panel (14) [E]    Lipid Panel [E]    Vitamin D [E]    Hemoglobin A1C [E]    Urinalysis, Routine [E]    Microalb/Creat Ratio, Random Urine    Assay, Thyroid Stim Hormone    Free T4, (Free Thyroxine)    Vitamin D, 25-Hydroxy    TdaP (Adacel, Boostrix) [40960]    ThinPrep PAP with HPV Reflex Request       Meds & Refills for this Visit:  Requested Prescriptions     Signed Prescriptions Disp Refills    levothyroxine 75 MCG Oral Tab 90 tablet 1     Sig: Take 1 tablet (75 mcg total) by mouth daily.       Imaging & Consults:  TETANUS, DIPHTHERIA TOXOIDS AND ACELLULAR PERTUSIS VACCINE (TDAP), >7 YEARS, IM USE  OP REFERRAL PAIN MANGEMENT  BAKARI 
Dr. Graves
YOSVANY 2D+3D SCREENING BILAT (CPT=77067/89063)         Pt' s weight is Body mass index is 21.61 kg/m²., recommended low fat diet and aerobic exercise 30 minutes three times weekly.    The patient indicates understanding of these issues and agrees to the plan.  The patient is asked to return in Return in about 1 year (around 3/20/2025) for physical.        
dr john
Dr. Betts
